# Patient Record
Sex: FEMALE | Race: WHITE | Employment: FULL TIME | URBAN - METROPOLITAN AREA
[De-identification: names, ages, dates, MRNs, and addresses within clinical notes are randomized per-mention and may not be internally consistent; named-entity substitution may affect disease eponyms.]

---

## 2017-01-23 RX ORDER — LOSARTAN POTASSIUM 100 MG/1
100 TABLET ORAL DAILY
Qty: 90 TABLET | Refills: 1 | Status: SHIPPED | OUTPATIENT
Start: 2017-01-23 | End: 2017-08-09 | Stop reason: SDUPTHER

## 2017-01-23 RX ORDER — SIMVASTATIN 20 MG
20 TABLET ORAL NIGHTLY
Qty: 90 TABLET | Refills: 1 | Status: SHIPPED | OUTPATIENT
Start: 2017-01-23 | End: 2017-08-09 | Stop reason: SDUPTHER

## 2017-02-08 RX ORDER — HYDROCHLOROTHIAZIDE 25 MG/1
25 TABLET ORAL DAILY
Qty: 90 TABLET | Refills: 1 | Status: SHIPPED | OUTPATIENT
Start: 2017-02-08 | End: 2017-08-08 | Stop reason: SDUPTHER

## 2017-02-24 DIAGNOSIS — F32.0 MAJOR DEPRESSIVE DISORDER, SINGLE EPISODE, MILD (HCC): ICD-10-CM

## 2017-02-24 DIAGNOSIS — E88.81 DYSMETABOLIC SYNDROME X: ICD-10-CM

## 2017-02-24 RX ORDER — METFORMIN HYDROCHLORIDE 500 MG/1
1000 TABLET, EXTENDED RELEASE ORAL 2 TIMES DAILY
Qty: 360 TABLET | Refills: 1 | Status: SHIPPED | OUTPATIENT
Start: 2017-02-24 | End: 2017-08-08 | Stop reason: SDUPTHER

## 2017-02-24 RX ORDER — ASPIRIN 81 MG/1
81 TABLET ORAL DAILY
Qty: 90 TABLET | Refills: 1 | Status: SHIPPED | OUTPATIENT
Start: 2017-02-24 | End: 2017-08-07 | Stop reason: SDUPTHER

## 2017-02-24 RX ORDER — CITALOPRAM 20 MG/1
20 TABLET ORAL DAILY
Qty: 90 TABLET | Refills: 1 | Status: SHIPPED | OUTPATIENT
Start: 2017-02-24 | End: 2017-10-23 | Stop reason: SDUPTHER

## 2017-08-08 DIAGNOSIS — E88.81 DYSMETABOLIC SYNDROME X: ICD-10-CM

## 2017-08-08 DIAGNOSIS — I10 ESSENTIAL HYPERTENSION: Primary | ICD-10-CM

## 2017-08-08 DIAGNOSIS — E78.5 HYPERLIPIDEMIA, UNSPECIFIED HYPERLIPIDEMIA TYPE: ICD-10-CM

## 2017-08-08 RX ORDER — METFORMIN HYDROCHLORIDE 500 MG/1
1000 TABLET, EXTENDED RELEASE ORAL 2 TIMES DAILY
Qty: 360 TABLET | Refills: 3 | Status: SHIPPED | OUTPATIENT
Start: 2017-08-08 | End: 2018-07-26 | Stop reason: SDUPTHER

## 2017-08-08 RX ORDER — LOSARTAN POTASSIUM 100 MG/1
100 TABLET ORAL DAILY
Qty: 90 TABLET | Refills: 3 | Status: SHIPPED | OUTPATIENT
Start: 2017-08-08 | End: 2017-08-09 | Stop reason: SDUPTHER

## 2017-08-08 RX ORDER — HYDROCHLOROTHIAZIDE 25 MG/1
25 TABLET ORAL DAILY
Qty: 90 TABLET | Refills: 3 | Status: SHIPPED | OUTPATIENT
Start: 2017-08-08 | End: 2018-07-26 | Stop reason: SDUPTHER

## 2017-08-08 RX ORDER — SIMVASTATIN 20 MG
20 TABLET ORAL NIGHTLY
Qty: 90 TABLET | Refills: 3 | Status: SHIPPED | OUTPATIENT
Start: 2017-08-08 | End: 2017-08-09 | Stop reason: SDUPTHER

## 2017-08-09 DIAGNOSIS — F32.0 MAJOR DEPRESSIVE DISORDER, SINGLE EPISODE, MILD (HCC): ICD-10-CM

## 2017-08-09 DIAGNOSIS — E78.5 HYPERLIPIDEMIA, UNSPECIFIED HYPERLIPIDEMIA TYPE: ICD-10-CM

## 2017-08-09 DIAGNOSIS — E88.81 DYSMETABOLIC SYNDROME X: ICD-10-CM

## 2017-08-09 DIAGNOSIS — I10 ESSENTIAL HYPERTENSION: ICD-10-CM

## 2017-08-09 RX ORDER — CITALOPRAM 20 MG/1
20 TABLET ORAL DAILY
Qty: 90 TABLET | Refills: 1 | Status: CANCELLED | OUTPATIENT
Start: 2017-08-09 | End: 2018-08-09

## 2017-08-09 RX ORDER — SIMVASTATIN 20 MG
20 TABLET ORAL NIGHTLY
Qty: 90 TABLET | Refills: 3 | Status: SHIPPED | OUTPATIENT
Start: 2017-08-09 | End: 2018-10-04

## 2017-08-09 RX ORDER — LOSARTAN POTASSIUM 100 MG/1
100 TABLET ORAL DAILY
Qty: 90 TABLET | Refills: 3 | Status: SHIPPED | OUTPATIENT
Start: 2017-08-09 | End: 2018-12-06 | Stop reason: SDUPTHER

## 2017-08-09 RX ORDER — METFORMIN HYDROCHLORIDE 500 MG/1
1000 TABLET, EXTENDED RELEASE ORAL 2 TIMES DAILY
Qty: 360 TABLET | Refills: 3 | Status: CANCELLED | OUTPATIENT
Start: 2017-08-09 | End: 2018-08-09

## 2017-08-09 RX ORDER — ASPIRIN 81 MG/1
81 TABLET ORAL DAILY
Qty: 90 TABLET | Refills: 3 | Status: CANCELLED | OUTPATIENT
Start: 2017-08-09 | End: 2018-08-09

## 2017-08-09 RX ORDER — HYDROCHLOROTHIAZIDE 25 MG/1
25 TABLET ORAL DAILY
Qty: 90 TABLET | Refills: 3 | Status: CANCELLED | OUTPATIENT
Start: 2017-08-09 | End: 2018-08-09

## 2017-08-28 ENCOUNTER — OFFICE VISIT (OUTPATIENT)
Dept: FAMILY MEDICINE CLINIC | Age: 62
End: 2017-08-28
Payer: COMMERCIAL

## 2017-08-28 VITALS
WEIGHT: 191 LBS | HEART RATE: 76 BPM | DIASTOLIC BLOOD PRESSURE: 82 MMHG | BODY MASS INDEX: 36.69 KG/M2 | RESPIRATION RATE: 16 BRPM | TEMPERATURE: 98.8 F | SYSTOLIC BLOOD PRESSURE: 120 MMHG

## 2017-08-28 DIAGNOSIS — M25.411 SHOULDER EFFUSION, RIGHT: ICD-10-CM

## 2017-08-28 DIAGNOSIS — S46.811D INFRASPINATUS TENDON TEAR, RIGHT, SUBSEQUENT ENCOUNTER: ICD-10-CM

## 2017-08-28 DIAGNOSIS — E88.81 DYSMETABOLIC SYNDROME X: ICD-10-CM

## 2017-08-28 DIAGNOSIS — E78.2 MIXED HYPERLIPIDEMIA: ICD-10-CM

## 2017-08-28 DIAGNOSIS — G89.29 CHRONIC RIGHT SHOULDER PAIN: ICD-10-CM

## 2017-08-28 DIAGNOSIS — E78.5 HYPERLIPIDEMIA, UNSPECIFIED HYPERLIPIDEMIA TYPE: ICD-10-CM

## 2017-08-28 DIAGNOSIS — Z01.818 PRE-OP EVALUATION: Primary | ICD-10-CM

## 2017-08-28 DIAGNOSIS — M25.511 CHRONIC RIGHT SHOULDER PAIN: ICD-10-CM

## 2017-08-28 DIAGNOSIS — I10 ESSENTIAL HYPERTENSION: ICD-10-CM

## 2017-08-28 PROCEDURE — 99214 OFFICE O/P EST MOD 30 MIN: CPT | Performed by: NURSE PRACTITIONER

## 2017-08-28 PROCEDURE — 93000 ELECTROCARDIOGRAM COMPLETE: CPT | Performed by: NURSE PRACTITIONER

## 2017-08-28 RX ORDER — HYDROCODONE BITARTRATE AND ACETAMINOPHEN 5; 325 MG/1; MG/1
1 TABLET ORAL EVERY 8 HOURS PRN
COMMUNITY
Start: 2018-07-28 | End: 2018-08-28

## 2017-08-28 ASSESSMENT — ENCOUNTER SYMPTOMS
ALLERGIC/IMMUNOLOGIC COMMENTS: SULFA
EYE PAIN: 0
CONSTIPATION: 0
TROUBLE SWALLOWING: 0
BACK PAIN: 0
BLOOD IN STOOL: 0
SINUS PRESSURE: 0
VOMITING: 0
RHINORRHEA: 0
NAUSEA: 0
EYE REDNESS: 0
SHORTNESS OF BREATH: 0
EYE DISCHARGE: 0
COUGH: 0
ABDOMINAL PAIN: 0
WHEEZING: 0
COLOR CHANGE: 0
CHEST TIGHTNESS: 0
DIARRHEA: 0
SORE THROAT: 0

## 2017-08-29 ENCOUNTER — HOSPITAL ENCOUNTER (OUTPATIENT)
Age: 62
Discharge: HOME | End: 2017-08-29
Payer: COMMERCIAL

## 2017-08-29 DIAGNOSIS — I10 ESSENTIAL HYPERTENSION: ICD-10-CM

## 2017-08-29 DIAGNOSIS — E78.2 MIXED HYPERLIPIDEMIA: ICD-10-CM

## 2017-08-29 DIAGNOSIS — E78.5 HYPERLIPIDEMIA, UNSPECIFIED HYPERLIPIDEMIA TYPE: ICD-10-CM

## 2017-08-29 DIAGNOSIS — E88.81 DYSMETABOLIC SYNDROME X: ICD-10-CM

## 2017-08-29 DIAGNOSIS — Z01.818 PRE-OP EVALUATION: ICD-10-CM

## 2017-08-29 LAB
-: NORMAL
ALBUMIN SERPL-MCNC: 4.3 G/DL (ref 3.5–5.2)
ALBUMIN/GLOBULIN RATIO: NORMAL (ref 1–2.5)
ALP BLD-CCNC: 56 U/L (ref 35–104)
ALT SERPL-CCNC: 21 U/L (ref 5–33)
AMORPHOUS: NORMAL
ANION GAP SERPL CALCULATED.3IONS-SCNC: 11 MMOL/L (ref 9–17)
AST SERPL-CCNC: 19 U/L
BACTERIA: NORMAL
BILIRUB SERPL-MCNC: 0.42 MG/DL (ref 0.3–1.2)
BILIRUBIN URINE: NEGATIVE
BUN BLDV-MCNC: 11 MG/DL (ref 8–23)
BUN/CREAT BLD: 20 (ref 9–20)
CALCIUM SERPL-MCNC: 9.4 MG/DL (ref 8.6–10.4)
CASTS UA: NORMAL /LPF
CHLORIDE BLD-SCNC: 100 MMOL/L (ref 98–107)
CHOLESTEROL/HDL RATIO: 2.7
CHOLESTEROL: 183 MG/DL
CO2: 30 MMOL/L (ref 20–31)
COLOR: YELLOW
COMMENT UA: ABNORMAL
CREAT SERPL-MCNC: 0.56 MG/DL (ref 0.5–0.9)
CRYSTALS, UA: NORMAL /HPF
EKG ATRIAL RATE: 66 BPM
EKG P AXIS: 3 DEGREES
EKG P-R INTERVAL: 156 MS
EKG Q-T INTERVAL: 426 MS
EKG QRS DURATION: 70 MS
EKG QTC CALCULATION (BAZETT): 446 MS
EKG R AXIS: -7 DEGREES
EKG T AXIS: 35 DEGREES
EKG VENTRICULAR RATE: 66 BPM
EPITHELIAL CELLS UA: NORMAL /HPF
ESTIMATED AVERAGE GLUCOSE: 117 MG/DL
GFR AFRICAN AMERICAN: >60 ML/MIN
GFR NON-AFRICAN AMERICAN: >60 ML/MIN
GFR SERPL CREATININE-BSD FRML MDRD: NORMAL ML/MIN/{1.73_M2}
GFR SERPL CREATININE-BSD FRML MDRD: NORMAL ML/MIN/{1.73_M2}
GLUCOSE BLD-MCNC: 96 MG/DL (ref 70–99)
GLUCOSE URINE: NEGATIVE
HBA1C MFR BLD: 5.7 % (ref 4–6)
HCT VFR BLD CALC: 38.1 % (ref 36–46)
HDLC SERPL-MCNC: 69 MG/DL
HEMOGLOBIN: 13 G/DL (ref 12–16)
KETONES, URINE: NEGATIVE
LDL CHOLESTEROL: 72 MG/DL (ref 0–130)
LEUKOCYTE ESTERASE, URINE: ABNORMAL
MCH RBC QN AUTO: 30.7 PG (ref 26–34)
MCHC RBC AUTO-ENTMCNC: 34 G/DL (ref 31–37)
MCV RBC AUTO: 90.2 FL (ref 80–100)
MUCUS: NORMAL
NITRITE, URINE: NEGATIVE
OTHER OBSERVATIONS UA: NORMAL
PDW BLD-RTO: 13.1 % (ref 11.5–14.5)
PH UA: 6.5 (ref 5–8)
PLATELET # BLD: 252 K/UL (ref 130–400)
PMV BLD AUTO: 7 FL (ref 6–12)
POTASSIUM SERPL-SCNC: 4 MMOL/L (ref 3.7–5.3)
PROTEIN UA: NEGATIVE
RBC # BLD: 4.23 M/UL (ref 4–5.2)
RBC UA: NORMAL /HPF (ref 0–2)
RENAL EPITHELIAL, UA: NORMAL /HPF
SODIUM BLD-SCNC: 141 MMOL/L (ref 135–144)
SPECIFIC GRAVITY UA: 1.01 (ref 1–1.03)
TOTAL PROTEIN: 6.7 G/DL (ref 6.4–8.3)
TRICHOMONAS: NORMAL
TRIGL SERPL-MCNC: 212 MG/DL
TURBIDITY: ABNORMAL
URINE HGB: ABNORMAL
UROBILINOGEN, URINE: NORMAL
VLDLC SERPL CALC-MCNC: ABNORMAL MG/DL (ref 1–30)
WBC # BLD: 6.6 K/UL (ref 3.5–11)
WBC UA: NORMAL /HPF (ref 0–5)
YEAST: NORMAL

## 2017-08-29 PROCEDURE — 87088 URINE BACTERIA CULTURE: CPT

## 2017-08-29 PROCEDURE — 87186 SC STD MICRODIL/AGAR DIL: CPT

## 2017-08-29 PROCEDURE — 85027 COMPLETE CBC AUTOMATED: CPT

## 2017-08-29 PROCEDURE — 81001 URINALYSIS AUTO W/SCOPE: CPT

## 2017-08-29 PROCEDURE — 83036 HEMOGLOBIN GLYCOSYLATED A1C: CPT

## 2017-08-29 PROCEDURE — 80061 LIPID PANEL: CPT

## 2017-08-29 PROCEDURE — 87086 URINE CULTURE/COLONY COUNT: CPT

## 2017-08-29 PROCEDURE — 80053 COMPREHEN METABOLIC PANEL: CPT

## 2017-08-29 PROCEDURE — 93005 ELECTROCARDIOGRAM TRACING: CPT

## 2017-08-29 PROCEDURE — 36415 COLL VENOUS BLD VENIPUNCTURE: CPT

## 2017-08-30 DIAGNOSIS — N30.01 ACUTE CYSTITIS WITH HEMATURIA: Primary | ICD-10-CM

## 2017-08-30 LAB
CULTURE: ABNORMAL
CULTURE: ABNORMAL
Lab: ABNORMAL
ORGANISM: ABNORMAL
SPECIMEN DESCRIPTION: ABNORMAL
SPECIMEN DESCRIPTION: ABNORMAL
STATUS: ABNORMAL

## 2017-08-30 RX ORDER — NITROFURANTOIN 25; 75 MG/1; MG/1
100 CAPSULE ORAL 2 TIMES DAILY
Qty: 14 CAPSULE | Refills: 0 | Status: SHIPPED | OUTPATIENT
Start: 2017-08-30 | End: 2017-09-06

## 2017-10-23 DIAGNOSIS — F32.0 MAJOR DEPRESSIVE DISORDER, SINGLE EPISODE, MILD (HCC): ICD-10-CM

## 2017-10-23 RX ORDER — CITALOPRAM 20 MG/1
20 TABLET ORAL DAILY
Qty: 90 TABLET | Refills: 1 | Status: SHIPPED | OUTPATIENT
Start: 2017-10-23 | End: 2018-03-02 | Stop reason: SDUPTHER

## 2017-10-23 NOTE — TELEPHONE ENCOUNTER
LOV: 08/28/17  LR: Jack : 02/24/17    Health Maintenance   Topic Date Due    Zostavax vaccine  02/10/2017    Flu vaccine (1) 09/01/2017    Breast cancer screen  12/05/2018    Cervical cancer screen  10/26/2019    Colon cancer screen colonoscopy  11/12/2020    Lipid screen  08/29/2022    DTaP/Tdap/Td vaccine (2 - Td) 02/27/2023    Hepatitis C screen  Completed    HIV screen  Completed             (applicable per patient's age: Cancer Screenings, Depression Screening, Fall Risk Screening, Immunizations)    Hemoglobin A1C (%)   Date Value   08/29/2017 5.7   10/28/2016 5.9     LDL Cholesterol (mg/dL)   Date Value   08/29/2017 72     AST (U/L)   Date Value   08/29/2017 19     ALT (U/L)   Date Value   08/29/2017 21     BUN (mg/dL)   Date Value   08/29/2017 11      (goal A1C is < 7)   (goal LDL is <100) need 30-50% reduction from baseline     BP Readings from Last 3 Encounters:   08/28/17 120/82   10/26/16 116/74   02/10/16 128/82    (goal /80)      All Future Testing planned in [facility]:  Lab Frequency Next Occurrence   PAP Smear Once 10/26/2016   EKG 12 Lead Once 08/28/2017       Next Visit Date:  No future appointments.          Patient Active Problem List:     Dysmetabolic syndrome X     Essential hypertension     Other and unspecified hyperlipidemia     Hyperlipidemia     Atypical lobular hyperplasia of left breast     Nephrolithiasis     Major depressive disorder, single episode, mild (HCC)

## 2018-03-02 DIAGNOSIS — F32.0 MAJOR DEPRESSIVE DISORDER, SINGLE EPISODE, MILD (HCC): ICD-10-CM

## 2018-03-02 RX ORDER — CITALOPRAM 20 MG/1
20 TABLET ORAL DAILY
Qty: 90 TABLET | Refills: 1 | Status: SHIPPED | OUTPATIENT
Start: 2018-03-02 | End: 2018-10-04

## 2018-03-02 NOTE — TELEPHONE ENCOUNTER
LOV was 10-26-16, LR was 10-23-17. cm  Next Visit Date:  No future appointments.     Health Maintenance   Topic Date Due    Shingles Vaccine (1 of 2 - 2 Dose Series) 02/10/2007    Flu vaccine (1) 09/01/2017    A1C test (Diabetic or Prediabetic)  08/29/2018    Potassium monitoring  08/29/2018    Creatinine monitoring  08/29/2018    Breast cancer screen  12/05/2018    Cervical cancer screen  10/26/2019    Colon cancer screen colonoscopy  11/12/2020    Lipid screen  08/29/2022    DTaP/Tdap/Td vaccine (2 - Td) 02/27/2023    Hepatitis C screen  Completed    HIV screen  Completed       Hemoglobin A1C (%)   Date Value   08/29/2017 5.7   10/28/2016 5.9             ( goal A1C is < 7)   No results found for: LABMICR  LDL Cholesterol (mg/dL)   Date Value   08/29/2017 72       (goal LDL is <100)   AST (U/L)   Date Value   08/29/2017 19     ALT (U/L)   Date Value   08/29/2017 21     BUN (mg/dL)   Date Value   08/29/2017 11     BP Readings from Last 3 Encounters:   08/28/17 120/82   10/26/16 116/74   02/10/16 128/82          (goal 120/80)    All Future Testing planned in [facility]  Lab Frequency Next Occurrence   EKG 12 Lead Once 08/28/2017               Patient Active Problem List:     Dysmetabolic syndrome X     Essential hypertension     Other and unspecified hyperlipidemia     Hyperlipidemia     Atypical lobular hyperplasia of left breast     Nephrolithiasis     Major depressive disorder, single episode, mild ([address]

## 2018-04-26 DIAGNOSIS — F32.0 MAJOR DEPRESSIVE DISORDER, SINGLE EPISODE, MILD (HCC): ICD-10-CM

## 2018-04-26 RX ORDER — CITALOPRAM 20 MG/1
20 TABLET ORAL DAILY
Qty: 90 TABLET | Refills: 1 | OUTPATIENT
Start: 2018-04-26 | End: 2019-04-26

## 2018-07-26 DIAGNOSIS — E88.81 DYSMETABOLIC SYNDROME X: ICD-10-CM

## 2018-07-26 DIAGNOSIS — I10 ESSENTIAL HYPERTENSION: ICD-10-CM

## 2018-07-26 RX ORDER — HYDROCHLOROTHIAZIDE 25 MG/1
25 TABLET ORAL DAILY
Qty: 90 TABLET | Refills: 1 | Status: SHIPPED | OUTPATIENT
Start: 2018-07-26 | End: 2018-12-06 | Stop reason: SDUPTHER

## 2018-07-26 RX ORDER — METFORMIN HYDROCHLORIDE 500 MG/1
1000 TABLET, EXTENDED RELEASE ORAL 2 TIMES DAILY
Qty: 360 TABLET | Refills: 1 | Status: SHIPPED | OUTPATIENT
Start: 2018-07-26 | End: 2018-12-06 | Stop reason: SDUPTHER

## 2018-07-26 NOTE — TELEPHONE ENCOUNTER
LOV:8-28-17  ZSB:[birth year]  LRF:8-8-17  Health Maintenance   Topic Date Due    Shingles Vaccine (1 of 2 - 2 Dose Series) 02/10/2007    A1C test (Diabetic or Prediabetic)  08/29/2018    Potassium monitoring  08/29/2018    Creatinine monitoring  08/29/2018    Flu vaccine (1) 09/01/2018    Breast cancer screen  12/05/2018    Cervical cancer screen  10/26/2019    Colon cancer screen colonoscopy  11/12/2020    Lipid screen  08/29/2022    DTaP/Tdap/Td vaccine (2 - Td) 02/27/2023    Hepatitis C screen  Completed    HIV screen  Completed             (applicable per patient's age: Cancer Screenings, Depression Screening, Fall Risk Screening, Immunizations)    Hemoglobin A1C (%)   Date Value   08/29/2017 5.7   10/28/2016 5.9     LDL Cholesterol (mg/dL)   Date Value   08/29/2017 72     AST (U/L)   Date Value   08/29/2017 19     ALT (U/L)   Date Value   08/29/2017 21     BUN (mg/dL)   Date Value   08/29/2017 11      (goal A1C is < 7)   (goal LDL is <100) need 30-50% reduction from baseline     BP Readings from Last 3 Encounters:   08/28/17 120/82   10/26/16 116/74   02/10/16 128/82    (goal /80)      All Future Testing planned in [facility]:  Lab Frequency Next Occurrence   EKG 12 Lead Once 08/28/2017       Next Visit Date:  Future Appointments  Date Time Provider Henry Spencer   10/4/2018 1:20 PM [facility] [address] [facility]            Patient Active Problem List:     Dysmetabolic syndrome X     Essential hypertension     Other and unspecified hyperlipidemia     Hyperlipidemia     Atypical lobular hyperplasia of left breast     Nephrolithiasis     Major depressive disorder, single episode, mild ([address]

## 2018-08-20 DIAGNOSIS — N20.0 NEPHROLITHIASIS: ICD-10-CM

## 2018-08-20 RX ORDER — HYDROCODONE BITARTRATE AND ACETAMINOPHEN 5; 325 MG/1; MG/1
1 TABLET ORAL EVERY 6 HOURS PRN
Qty: 30 TABLET | Refills: 0 | Status: SHIPPED | OUTPATIENT
Start: 2018-08-20 | End: 2018-08-25

## 2018-09-26 ENCOUNTER — TELEPHONE (OUTPATIENT)
Dept: FAMILY MEDICINE CLINIC | Age: 63
End: 2018-09-26

## 2018-09-26 DIAGNOSIS — E78.5 HYPERLIPIDEMIA, UNSPECIFIED HYPERLIPIDEMIA TYPE: ICD-10-CM

## 2018-09-26 DIAGNOSIS — I10 ESSENTIAL HYPERTENSION: ICD-10-CM

## 2018-09-26 DIAGNOSIS — E88.81 DYSMETABOLIC SYNDROME X: Primary | ICD-10-CM

## 2018-10-02 ENCOUNTER — HOSPITAL ENCOUNTER (OUTPATIENT)
Age: 63
Setting detail: SPECIMEN
Discharge: HOME | End: 2018-10-02
Payer: COMMERCIAL

## 2018-10-02 DIAGNOSIS — I10 ESSENTIAL HYPERTENSION: ICD-10-CM

## 2018-10-02 DIAGNOSIS — E88.81 DYSMETABOLIC SYNDROME X: ICD-10-CM

## 2018-10-02 DIAGNOSIS — E78.5 HYPERLIPIDEMIA, UNSPECIFIED HYPERLIPIDEMIA TYPE: ICD-10-CM

## 2018-10-02 LAB
ALBUMIN SERPL-MCNC: 4.5 G/DL (ref 3.5–5.2)
ALBUMIN/GLOBULIN RATIO: 2 (ref 1–2.5)
ALP BLD-CCNC: 75 U/L (ref 35–104)
ALT SERPL-CCNC: 25 U/L (ref 5–33)
ANION GAP SERPL CALCULATED.3IONS-SCNC: 13 MMOL/L (ref 9–17)
AST SERPL-CCNC: 25 U/L
BILIRUB SERPL-MCNC: 0.49 MG/DL (ref 0.3–1.2)
BUN BLDV-MCNC: 14 MG/DL (ref 8–23)
BUN/CREAT BLD: ABNORMAL (ref 9–20)
CALCIUM SERPL-MCNC: 9.2 MG/DL (ref 8.6–10.4)
CHLORIDE BLD-SCNC: 102 MMOL/L (ref 98–107)
CHOLESTEROL/HDL RATIO: 3
CHOLESTEROL: 207 MG/DL
CO2: 28 MMOL/L (ref 20–31)
CREAT SERPL-MCNC: 0.62 MG/DL (ref 0.5–0.9)
GFR AFRICAN AMERICAN: >60 ML/MIN
GFR NON-AFRICAN AMERICAN: >60 ML/MIN
GFR SERPL CREATININE-BSD FRML MDRD: ABNORMAL ML/MIN/{1.73_M2}
GFR SERPL CREATININE-BSD FRML MDRD: ABNORMAL ML/MIN/{1.73_M2}
GLUCOSE BLD-MCNC: 134 MG/DL (ref 70–99)
HCT VFR BLD CALC: 42.4 % (ref 36.3–47.1)
HDLC SERPL-MCNC: 68 MG/DL
HEMOGLOBIN: 13.2 G/DL (ref 11.9–15.1)
LDL CHOLESTEROL: 115 MG/DL (ref 0–130)
MCH RBC QN AUTO: 29.5 PG (ref 25.2–33.5)
MCHC RBC AUTO-ENTMCNC: 31.1 G/DL (ref 28.4–34.8)
MCV RBC AUTO: 94.6 FL (ref 82.6–102.9)
NRBC AUTOMATED: 0 PER 100 WBC
PDW BLD-RTO: 13.6 % (ref 11.8–14.4)
PLATELET # BLD: 244 K/UL (ref 138–453)
PMV BLD AUTO: 9.7 FL (ref 8.1–13.5)
POTASSIUM SERPL-SCNC: 3.9 MMOL/L (ref 3.7–5.3)
RBC # BLD: 4.48 M/UL (ref 3.95–5.11)
SODIUM BLD-SCNC: 143 MMOL/L (ref 135–144)
TOTAL PROTEIN: 6.8 G/DL (ref 6.4–8.3)
TRIGL SERPL-MCNC: 121 MG/DL
VLDLC SERPL CALC-MCNC: ABNORMAL MG/DL (ref 1–30)
WBC # BLD: 5.8 K/UL (ref 3.5–11.3)

## 2018-10-03 LAB
ESTIMATED AVERAGE GLUCOSE: 143 MG/DL
HBA1C MFR BLD: 6.6 % (ref 4–6)

## 2018-10-04 ENCOUNTER — HOSPITAL ENCOUNTER (OUTPATIENT)
Age: 63
Setting detail: SPECIMEN
Discharge: HOME | End: 2018-10-04
Payer: COMMERCIAL

## 2018-10-04 ENCOUNTER — OFFICE VISIT (OUTPATIENT)
Dept: FAMILY MEDICINE CLINIC | Age: 63
End: 2018-10-04
Payer: COMMERCIAL

## 2018-10-04 VITALS
DIASTOLIC BLOOD PRESSURE: 74 MMHG | TEMPERATURE: 98.5 F | RESPIRATION RATE: 14 BRPM | HEIGHT: 61 IN | WEIGHT: 201 LBS | BODY MASS INDEX: 37.95 KG/M2 | HEART RATE: 76 BPM | SYSTOLIC BLOOD PRESSURE: 126 MMHG

## 2018-10-04 DIAGNOSIS — Z87.442 HISTORY OF KIDNEY STONES: ICD-10-CM

## 2018-10-04 DIAGNOSIS — Z12.39 SCREENING FOR BREAST CANCER: ICD-10-CM

## 2018-10-04 DIAGNOSIS — R10.9 BILATERAL FLANK PAIN: ICD-10-CM

## 2018-10-04 DIAGNOSIS — N60.92 ATYPICAL LOBULAR HYPERPLASIA OF LEFT BREAST: ICD-10-CM

## 2018-10-04 DIAGNOSIS — E11.9 TYPE 2 DIABETES MELLITUS WITHOUT COMPLICATION, WITHOUT LONG-TERM CURRENT USE OF INSULIN (HCC): ICD-10-CM

## 2018-10-04 DIAGNOSIS — E88.81 DYSMETABOLIC SYNDROME X: ICD-10-CM

## 2018-10-04 DIAGNOSIS — I10 ESSENTIAL HYPERTENSION: ICD-10-CM

## 2018-10-04 DIAGNOSIS — F41.1 GENERALIZED ANXIETY DISORDER: ICD-10-CM

## 2018-10-04 DIAGNOSIS — F32.0 MAJOR DEPRESSIVE DISORDER, SINGLE EPISODE, MILD (HCC): ICD-10-CM

## 2018-10-04 DIAGNOSIS — Z00.00 ANNUAL PHYSICAL EXAM: Primary | ICD-10-CM

## 2018-10-04 DIAGNOSIS — E78.5 HYPERLIPIDEMIA, UNSPECIFIED HYPERLIPIDEMIA TYPE: ICD-10-CM

## 2018-10-04 DIAGNOSIS — R73.01 IMPAIRED FASTING BLOOD SUGAR: ICD-10-CM

## 2018-10-04 DIAGNOSIS — R80.9 MICROALBUMINURIA: ICD-10-CM

## 2018-10-04 DIAGNOSIS — Z13.31 POSITIVE DEPRESSION SCREENING: ICD-10-CM

## 2018-10-04 LAB
-: ABNORMAL
AMORPHOUS: ABNORMAL
BACTERIA: ABNORMAL
BILIRUBIN URINE: NEGATIVE
CASTS UA: ABNORMAL /LPF (ref 0–8)
COLOR: YELLOW
COMMENT UA: ABNORMAL
CREATININE URINE: 62.6 MG/DL (ref 28–217)
CRYSTALS, UA: ABNORMAL /HPF
EPITHELIAL CELLS UA: ABNORMAL /HPF (ref 0–5)
GLUCOSE URINE: NEGATIVE
KETONES, URINE: NEGATIVE
LEUKOCYTE ESTERASE, URINE: ABNORMAL
MICROALBUMIN/CREAT 24H UR: 16 MG/L
MICROALBUMIN/CREAT UR-RTO: 26 MCG/MG CREAT
MUCUS: ABNORMAL
NITRITE, URINE: NEGATIVE
OTHER OBSERVATIONS UA: ABNORMAL
PH UA: 7 (ref 5–8)
PROTEIN UA: NEGATIVE
RBC UA: ABNORMAL /HPF (ref 0–4)
RENAL EPITHELIAL, UA: ABNORMAL /HPF
SPECIFIC GRAVITY UA: 1.02 (ref 1–1.03)
TRICHOMONAS: ABNORMAL
TURBIDITY: CLEAR
URINE HGB: NEGATIVE
UROBILINOGEN, URINE: NORMAL
WBC UA: ABNORMAL /HPF (ref 0–5)
YEAST: ABNORMAL

## 2018-10-04 PROCEDURE — 99396 PREV VISIT EST AGE 40-64: CPT | Performed by: PEDIATRICS

## 2018-10-04 PROCEDURE — G8431 POS CLIN DEPRES SCRN F/U DOC: HCPCS | Performed by: PEDIATRICS

## 2018-10-04 RX ORDER — GLUCOSAMINE HCL/CHONDROITIN SU 500-400 MG
CAPSULE ORAL
Qty: 100 STRIP | Refills: 3 | Status: SHIPPED | OUTPATIENT
Start: 2018-10-04

## 2018-10-04 RX ORDER — SIMVASTATIN 40 MG
40 TABLET ORAL EVERY EVENING
Qty: 30 TABLET | Refills: 5 | Status: SHIPPED | OUTPATIENT
Start: 2018-10-04 | End: 2018-12-06 | Stop reason: SDUPTHER

## 2018-10-04 RX ORDER — LANCETS 30 GAUGE
EACH MISCELLANEOUS
Qty: 100 EACH | Refills: 3 | Status: SHIPPED | OUTPATIENT
Start: 2018-10-04

## 2018-10-04 RX ORDER — BLOOD-GLUCOSE METER
1 KIT MISCELLANEOUS DAILY
Qty: 1 KIT | Refills: 0 | Status: SHIPPED | OUTPATIENT
Start: 2018-10-04 | End: 2022-09-01

## 2018-10-04 RX ORDER — CITALOPRAM 40 MG/1
40 TABLET ORAL DAILY
Qty: 30 TABLET | Refills: 5 | Status: SHIPPED | OUTPATIENT
Start: 2018-10-04 | End: 2018-12-06 | Stop reason: SDUPTHER

## 2018-10-04 ASSESSMENT — PATIENT HEALTH QUESTIONNAIRE - PHQ9
7. TROUBLE CONCENTRATING ON THINGS, SUCH AS READING THE NEWSPAPER OR WATCHING TELEVISION: 0
2. FEELING DOWN, DEPRESSED OR HOPELESS: 3
4. FEELING TIRED OR HAVING LITTLE ENERGY: 3
8. MOVING OR SPEAKING SO SLOWLY THAT OTHER PEOPLE COULD HAVE NOTICED. OR THE OPPOSITE, BEING SO FIGETY OR RESTLESS THAT YOU HAVE BEEN MOVING AROUND A LOT MORE THAN USUAL: 1
1. LITTLE INTEREST OR PLEASURE IN DOING THINGS: 3
SUM OF ALL RESPONSES TO PHQ QUESTIONS 1-9: 16
SUM OF ALL RESPONSES TO PHQ9 QUESTIONS 1 & 2: 6
6. FEELING BAD ABOUT YOURSELF - OR THAT YOU ARE A FAILURE OR HAVE LET YOURSELF OR YOUR FAMILY DOWN: 0
3. TROUBLE FALLING OR STAYING ASLEEP: 3
9. THOUGHTS THAT YOU WOULD BE BETTER OFF DEAD, OR OF HURTING YOURSELF: 0
10. IF YOU CHECKED OFF ANY PROBLEMS, HOW DIFFICULT HAVE THESE PROBLEMS MADE IT FOR YOU TO DO YOUR WORK, TAKE CARE OF THINGS AT HOME, OR GET ALONG WITH OTHER PEOPLE: 0
SUM OF ALL RESPONSES TO PHQ QUESTIONS 1-9: 16
5. POOR APPETITE OR OVEREATING: 3

## 2018-10-04 ASSESSMENT — ENCOUNTER SYMPTOMS
CONSTIPATION: 0
EYE REDNESS: 0
SHORTNESS OF BREATH: 0
COLOR CHANGE: 0
COUGH: 0
TROUBLE SWALLOWING: 0
BLOOD IN STOOL: 0
WHEEZING: 0
DIARRHEA: 0
EYE DISCHARGE: 0
NAUSEA: 0
SINUS PRESSURE: 0
BACK PAIN: 0
RHINORRHEA: 0
EYE PAIN: 0
ABDOMINAL PAIN: 0
CHEST TIGHTNESS: 0
VOMITING: 0
SORE THROAT: 0

## 2018-10-04 NOTE — PROGRESS NOTES
have a history of major depression and generalized anxiety disorder for which she has been taking Celexa 20 mg. She states that over the last 10 months she has had a lot of stress with her mother passing away and she's been fighting a lot with her sisters. She states she has been tired and feels exhausted. She has also been stress eating and has gained some weight. She just feels of her anxiety is not well controlled and she does have a depressed mood over this. She does have a history of kidney stones several years ago and she has passed these intermittently and easily. She did request a prescription for Norco several months ago because she has been having some intermittent flank pain off and on - more prominent on the right side. She does wonder if she may have an infection. She does feel bloated at times. She has also noticed cloudy urine with a strong odor. She denies any burning or hematuria. She did have a small area of atypical lobular hyperplasia removed from her left breast several years ago. She is due for routine mammogram.  She has no other concerns. cmw      Review of Systems   Constitutional: Positive for fatigue. Negative for appetite change, chills, fever and unexpected weight change. HENT: Negative for congestion, ear discharge, ear pain, postnasal drip, rhinorrhea, sinus pressure, sore throat, tinnitus and trouble swallowing. Eyes: Negative for pain, discharge and redness. Respiratory: Negative for cough, chest tightness, shortness of breath and wheezing. Snoring   Cardiovascular: Negative for chest pain, palpitations and leg swelling. Gastrointestinal: Negative for abdominal pain, blood in stool, constipation, diarrhea, nausea and vomiting. Endocrine: Negative for polydipsia and polyphagia. New-onset diabetes mellitus type 2 with hemoglobin A1c of 6.6   Genitourinary: Positive for flank pain.  Negative for decreased urine volume, difficulty urinating, dysuria, frequency, hematuria, pelvic pain, urgency and vaginal discharge. Cloudy urine, strong odor to urine for several months off and on with some lower back pain bilaterally. Musculoskeletal: Negative for arthralgias, back pain, myalgias and neck pain. Skin: Negative for color change and rash. Allergic/Immunologic: Negative for immunocompromised state. Neurological: Negative for dizziness, syncope, weakness, light-headedness and headaches. Hematological: Negative for adenopathy. Psychiatric/Behavioral: Positive for dysphoric mood (Previously well controlled with Celexa however symptoms have worsened due to increased stress). Negative for behavioral problems, confusion and sleep disturbance. The patient is nervous/anxious (frequent anxiety secondary to increased stress). Objective:     /74 (Site: Left Upper Arm, Position: Sitting, Cuff Size: Medium Adult)   Pulse 76   Temp 98.5 °F (36.9 °C) (Tympanic)   Resp 14   Ht 5' 0.5\" (1.537 m)   Wt 201 lb (91.2 kg)   LMP 06/21/2006   BMI 38.61 kg/m²        Physical Exam   Constitutional: She is oriented to person, place, and time. She appears well-developed and well-nourished. No distress. obese   HENT:   Head: Normocephalic. Right Ear: External ear normal.   Left Ear: External ear normal.   Nose: Nose normal.   Mouth/Throat: Oropharynx is clear and moist. No oropharyngeal exudate. Eyes: Pupils are equal, round, and reactive to light. Conjunctivae and EOM are normal. Right eye exhibits no discharge. Left eye exhibits no discharge. No scleral icterus. Neck: Normal range of motion. Neck supple. No JVD present. No thyromegaly present. Cardiovascular: Normal rate, regular rhythm and normal heart sounds. No murmur heard. Pulmonary/Chest: Effort normal and breath sounds normal. No respiratory distress. She has no rales. She exhibits no tenderness. Abdominal: Soft. Bowel sounds are normal. She exhibits no mass.  There is no

## 2018-10-07 LAB
CULTURE: ABNORMAL
Lab: ABNORMAL
ORGANISM: ABNORMAL
SPECIMEN DESCRIPTION: ABNORMAL
STATUS: ABNORMAL

## 2018-10-08 ENCOUNTER — TELEPHONE (OUTPATIENT)
Dept: FAMILY MEDICINE CLINIC | Age: 63
End: 2018-10-08

## 2018-10-08 DIAGNOSIS — F32.0 MAJOR DEPRESSIVE DISORDER, SINGLE EPISODE, MILD (HCC): ICD-10-CM

## 2018-10-08 DIAGNOSIS — N39.0 URINARY TRACT INFECTION WITHOUT HEMATURIA, SITE UNSPECIFIED: Primary | ICD-10-CM

## 2018-10-08 RX ORDER — CITALOPRAM 20 MG/1
20 TABLET ORAL DAILY
Qty: 90 TABLET | Refills: 1 | Status: SHIPPED | OUTPATIENT
Start: 2018-10-08 | End: 2018-12-06 | Stop reason: DRUGHIGH

## 2018-10-08 RX ORDER — CIPROFLOXACIN 250 MG/1
250 TABLET, FILM COATED ORAL 2 TIMES DAILY
Qty: 14 TABLET | Refills: 0 | Status: SHIPPED | OUTPATIENT
Start: 2018-10-08 | End: 2019-09-23 | Stop reason: SDUPTHER

## 2018-10-08 NOTE — TELEPHONE ENCOUNTER
Alberto KHAN TOLPP            Patient Active Problem List:     Dysmetabolic syndrome X     Essential hypertension     Other and unspecified hyperlipidemia     Hyperlipidemia     Atypical lobular hyperplasia of left breast     Nephrolithiasis     Major depressive disorder, single episode, mild (HCC)     Microalbuminuria     Type 2 diabetes mellitus without complication, without long-term current use of insulin ([facility] [address]

## 2018-12-06 ENCOUNTER — OFFICE VISIT (OUTPATIENT)
Dept: FAMILY MEDICINE CLINIC | Age: 63
End: 2018-12-06
Payer: COMMERCIAL

## 2018-12-06 VITALS
WEIGHT: 179 LBS | SYSTOLIC BLOOD PRESSURE: 118 MMHG | RESPIRATION RATE: 18 BRPM | DIASTOLIC BLOOD PRESSURE: 70 MMHG | BODY MASS INDEX: 34.38 KG/M2 | HEART RATE: 72 BPM | TEMPERATURE: 98.8 F

## 2018-12-06 DIAGNOSIS — F41.1 GENERALIZED ANXIETY DISORDER: ICD-10-CM

## 2018-12-06 DIAGNOSIS — E88.81 DYSMETABOLIC SYNDROME X: ICD-10-CM

## 2018-12-06 DIAGNOSIS — E11.9 TYPE 2 DIABETES MELLITUS WITHOUT COMPLICATION, WITHOUT LONG-TERM CURRENT USE OF INSULIN (HCC): Primary | ICD-10-CM

## 2018-12-06 DIAGNOSIS — E78.5 HYPERLIPIDEMIA, UNSPECIFIED HYPERLIPIDEMIA TYPE: ICD-10-CM

## 2018-12-06 DIAGNOSIS — F32.0 MAJOR DEPRESSIVE DISORDER, SINGLE EPISODE, MILD (HCC): ICD-10-CM

## 2018-12-06 DIAGNOSIS — R80.9 MICROALBUMINURIA: ICD-10-CM

## 2018-12-06 DIAGNOSIS — I10 ESSENTIAL HYPERTENSION: ICD-10-CM

## 2018-12-06 PROCEDURE — 99214 OFFICE O/P EST MOD 30 MIN: CPT | Performed by: PEDIATRICS

## 2018-12-06 RX ORDER — SIMVASTATIN 40 MG
40 TABLET ORAL EVERY EVENING
Qty: 90 TABLET | Refills: 3 | Status: SHIPPED | OUTPATIENT
Start: 2018-12-06 | End: 2019-04-05 | Stop reason: SDUPTHER

## 2018-12-06 RX ORDER — METFORMIN HYDROCHLORIDE 500 MG/1
1000 TABLET, EXTENDED RELEASE ORAL 2 TIMES DAILY
Qty: 360 TABLET | Refills: 3 | Status: SHIPPED | OUTPATIENT
Start: 2018-12-06 | End: 2020-05-21 | Stop reason: SDUPTHER

## 2018-12-06 RX ORDER — CITALOPRAM 40 MG/1
40 TABLET ORAL DAILY
Qty: 90 TABLET | Refills: 3 | Status: SHIPPED | OUTPATIENT
Start: 2018-12-06 | End: 2020-12-01 | Stop reason: SDUPTHER

## 2018-12-06 RX ORDER — LOSARTAN POTASSIUM 100 MG/1
100 TABLET ORAL DAILY
Qty: 90 TABLET | Refills: 3 | Status: SHIPPED | OUTPATIENT
Start: 2018-12-06 | End: 2019-04-05 | Stop reason: SDUPTHER

## 2018-12-06 RX ORDER — HYDROCHLOROTHIAZIDE 25 MG/1
25 TABLET ORAL DAILY
Qty: 90 TABLET | Refills: 3 | Status: SHIPPED | OUTPATIENT
Start: 2018-12-06 | End: 2020-05-21 | Stop reason: SDUPTHER

## 2018-12-06 ASSESSMENT — ENCOUNTER SYMPTOMS
BLOOD IN STOOL: 0
SORE THROAT: 0
COLOR CHANGE: 0
NAUSEA: 0
SHORTNESS OF BREATH: 0
TROUBLE SWALLOWING: 0
COUGH: 0
SINUS PRESSURE: 0
ABDOMINAL PAIN: 0
CHEST TIGHTNESS: 0
EYE REDNESS: 0
WHEEZING: 0
EYE PAIN: 0
VOMITING: 0
RHINORRHEA: 0
DIARRHEA: 0
CONSTIPATION: 0
EYE DISCHARGE: 0
BACK PAIN: 0

## 2018-12-06 NOTE — PROGRESS NOTES
adherence  Reviewed prior labs and health maintenance  Continue current medications, diet and exercise. Discussed use, benefit, and side effects of prescribed medications. Barriers to medication compliance addressed. Patient given educational materials - see patient instructions  Was a self-tracking handout given in paper form or via [employer]? No    Requested Prescriptions     Pending Prescriptions Disp Refills    citalopram (CELEXA) 40 MG tablet 90 tablet 3     Sig: Take 1 tablet by mouth daily    simvastatin (ZOCOR) 40 MG tablet 90 tablet 3     Sig: Take 1 tablet by mouth every evening    metFORMIN (GLUCOPHAGE-XR) 500 MG extended release tablet 360 tablet 3     Sig: Take 2 tablets by mouth 2 times daily    hydrochlorothiazide (HYDRODIURIL) 25 MG tablet 90 tablet 3     Sig: Take 1 tablet by mouth daily    losartan (COZAAR) 100 MG tablet 90 tablet 3     Sig: Take 1 tablet by mouth daily       All patient questions answered. Patient voiced understanding. Quality Measures    Body mass index is 34.38 kg/m². Elevated. Weight control planned discussed Healthy diet and regular exercise. BP: 118/70 Blood pressure is normal. Treatment plan consists of No treatment change needed.     Lab Results   Component Value Date    LDLCHOLESTEROL 115 10/02/2018    (goal LDL reduction with dx if diabetes is 50% LDL reduction)      PHQ Scores 10/4/2018   PHQ2 Score 6   PHQ9 Score 16     Interpretation of Total Score Depression Severity: 1-4 = Minimal depression, 5-9 = Mild depression, 10-14 = Moderate depression, 15-19 = Moderately severe depression, 20-27 = Severe depression    Electronically signed by Amisha Cordova MD on 12/6/2018 at 10:24 AM

## 2019-02-08 ENCOUNTER — PATIENT MESSAGE (OUTPATIENT)
Dept: FAMILY MEDICINE CLINIC | Age: 64
End: 2019-02-08

## 2019-02-08 DIAGNOSIS — Z12.39 SCREENING FOR BREAST CANCER: ICD-10-CM

## 2019-02-21 LAB
ALBUMIN SERPL-MCNC: 4.6 G/DL
ALP BLD-CCNC: 63 U/L
ALT SERPL-CCNC: 17 U/L
ANION GAP SERPL CALCULATED.3IONS-SCNC: NORMAL MMOL/L
AST SERPL-CCNC: 19 U/L
AVERAGE GLUCOSE: 117
BILIRUB SERPL-MCNC: 0.7 MG/DL (ref 0.1–1.4)
BUN BLDV-MCNC: 9 MG/DL
CALCIUM SERPL-MCNC: 9.9 MG/DL
CHLORIDE BLD-SCNC: 102 MMOL/L
CHOLESTEROL, TOTAL: 139 MG/DL
CHOLESTEROL/HDL RATIO: 2.2
CO2: 31 MMOL/L
CREAT SERPL-MCNC: 0.75 MG/DL
CREATININE, URINE: 128.87
GFR CALCULATED: >60
GLUCOSE BLD-MCNC: 91 MG/DL
HBA1C MFR BLD: 5.7 %
HDLC SERPL-MCNC: 63 MG/DL (ref 35–70)
LDL CHOLESTEROL CALCULATED: 57 MG/DL (ref 0–160)
MICROALBUMIN/CREAT 24H UR: 1.8 MG/G{CREAT}
MICROALBUMIN/CREAT UR-RTO: 14
POTASSIUM SERPL-SCNC: 4 MMOL/L
SODIUM BLD-SCNC: 143 MMOL/L
TOTAL PROTEIN: 7
TRIGL SERPL-MCNC: 96 MG/DL
VLDLC SERPL CALC-MCNC: 19 MG/DL

## 2019-02-22 DIAGNOSIS — I10 ESSENTIAL HYPERTENSION: ICD-10-CM

## 2019-02-22 DIAGNOSIS — E11.9 TYPE 2 DIABETES MELLITUS WITHOUT COMPLICATION, WITHOUT LONG-TERM CURRENT USE OF INSULIN (HCC): ICD-10-CM

## 2019-02-22 DIAGNOSIS — E78.5 HYPERLIPIDEMIA, UNSPECIFIED HYPERLIPIDEMIA TYPE: ICD-10-CM

## 2019-02-25 ENCOUNTER — OFFICE VISIT (OUTPATIENT)
Dept: FAMILY MEDICINE CLINIC | Age: 64
End: 2019-02-25
Payer: COMMERCIAL

## 2019-02-25 VITALS
RESPIRATION RATE: 20 BRPM | HEART RATE: 76 BPM | DIASTOLIC BLOOD PRESSURE: 74 MMHG | SYSTOLIC BLOOD PRESSURE: 116 MMHG | TEMPERATURE: 99.2 F | WEIGHT: 172 LBS | BODY MASS INDEX: 33.04 KG/M2

## 2019-02-25 DIAGNOSIS — E11.29 TYPE 2 DIABETES MELLITUS WITH MICROALBUMINURIA, WITHOUT LONG-TERM CURRENT USE OF INSULIN (HCC): Primary | ICD-10-CM

## 2019-02-25 DIAGNOSIS — R80.9 TYPE 2 DIABETES MELLITUS WITH MICROALBUMINURIA, WITHOUT LONG-TERM CURRENT USE OF INSULIN (HCC): Primary | ICD-10-CM

## 2019-02-25 DIAGNOSIS — E78.5 HYPERLIPIDEMIA, UNSPECIFIED HYPERLIPIDEMIA TYPE: ICD-10-CM

## 2019-02-25 DIAGNOSIS — F32.0 MAJOR DEPRESSIVE DISORDER, SINGLE EPISODE, MILD (HCC): ICD-10-CM

## 2019-02-25 DIAGNOSIS — F41.1 GENERALIZED ANXIETY DISORDER: ICD-10-CM

## 2019-02-25 DIAGNOSIS — R80.9 MICROALBUMINURIA: ICD-10-CM

## 2019-02-25 DIAGNOSIS — E88.81 DYSMETABOLIC SYNDROME X: ICD-10-CM

## 2019-02-25 DIAGNOSIS — I10 ESSENTIAL HYPERTENSION: ICD-10-CM

## 2019-02-25 DIAGNOSIS — Z23 NEED FOR PNEUMOCOCCAL VACCINATION: ICD-10-CM

## 2019-02-25 PROCEDURE — 90732 PPSV23 VACC 2 YRS+ SUBQ/IM: CPT | Performed by: PEDIATRICS

## 2019-02-25 PROCEDURE — 99214 OFFICE O/P EST MOD 30 MIN: CPT | Performed by: PEDIATRICS

## 2019-02-25 PROCEDURE — 90471 IMMUNIZATION ADMIN: CPT | Performed by: PEDIATRICS

## 2019-02-25 ASSESSMENT — ENCOUNTER SYMPTOMS
NAUSEA: 0
TROUBLE SWALLOWING: 0
CONSTIPATION: 0
CHEST TIGHTNESS: 0
SORE THROAT: 0
COLOR CHANGE: 0
WHEEZING: 0
RHINORRHEA: 0
COUGH: 0
EYE PAIN: 0
DIARRHEA: 0
EYE REDNESS: 0
ORTHOPNEA: 0
ABDOMINAL PAIN: 0
BLOOD IN STOOL: 0
SHORTNESS OF BREATH: 0
BLURRED VISION: 0
EYE DISCHARGE: 0
SINUS PRESSURE: 0
BACK PAIN: 0
VOMITING: 0

## 2019-04-05 DIAGNOSIS — E78.5 HYPERLIPIDEMIA, UNSPECIFIED HYPERLIPIDEMIA TYPE: ICD-10-CM

## 2019-04-05 DIAGNOSIS — I10 ESSENTIAL HYPERTENSION: ICD-10-CM

## 2019-04-05 RX ORDER — LOSARTAN POTASSIUM 100 MG/1
100 TABLET ORAL DAILY
Qty: 14 TABLET | Refills: 0 | Status: SHIPPED | OUTPATIENT
Start: 2019-04-05 | End: 2020-12-04 | Stop reason: SDUPTHER

## 2019-04-05 RX ORDER — SIMVASTATIN 40 MG
40 TABLET ORAL EVERY EVENING
Qty: 14 TABLET | Refills: 0 | Status: SHIPPED | OUTPATIENT
Start: 2019-04-05 | End: 2020-12-04 | Stop reason: SDUPTHER

## 2019-04-05 RX ORDER — LOSARTAN POTASSIUM 100 MG/1
100 TABLET ORAL DAILY
Qty: 90 TABLET | Refills: 1 | Status: SHIPPED | OUTPATIENT
Start: 2019-04-05 | End: 2019-08-28 | Stop reason: SDUPTHER

## 2019-04-05 RX ORDER — SIMVASTATIN 40 MG
40 TABLET ORAL EVERY EVENING
Qty: 90 TABLET | Refills: 1 | Status: SHIPPED | OUTPATIENT
Start: 2019-04-05 | End: 2019-08-28 | Stop reason: SDUPTHER

## 2019-04-05 NOTE — TELEPHONE ENCOUNTER
LOV: 2/25/2019  LRF: 12/6/2018  RTO: Scheduled    Patient needs short term supply sent to local pharmacy. Health Maintenance   Topic Date Due    Diabetic retinal exam  02/10/1965    Flu vaccine (Season Ended) 12/01/2019 (Originally 9/1/2019)    Shingles Vaccine (1 of 2) 12/01/2019 (Originally 2/10/2007)    Diabetic foot exam  10/04/2019    Cervical cancer screen  10/26/2019    A1C test (Diabetic or Prediabetic)  02/21/2020    Diabetic microalbuminuria test  02/21/2020    Lipid screen  02/21/2020    Potassium monitoring  02/21/2020    Creatinine monitoring  02/21/2020    Colon cancer screen colonoscopy  11/12/2020    Breast cancer screen  02/05/2021    DTaP/Tdap/Td vaccine (2 - Td) 02/27/2023    Pneumococcal 0-64 years at Risk Vaccine  Completed    Hepatitis C screen  Completed    HIV screen  Completed             (applicable per patient's age: Cancer Screenings, Depression Screening, Fall Risk Screening, Immunizations)    Hemoglobin A1C (%)   Date Value   02/21/2019 5.7   10/02/2018 6.6 (H)   08/29/2017 5.7     Microalb/Crt.  Ratio (mcg/mg creat)   Date Value   10/04/2018 26 (H)     LDL Cholesterol (mg/dL)   Date Value   10/02/2018 115     LDL Calculated (mg/dL)   Date Value   02/21/2019 57     AST (U/L)   Date Value   02/21/2019 19     ALT (U/L)   Date Value   02/21/2019 17     BUN (mg/dL)   Date Value   02/21/2019 9      (goal A1C is < 7)   (goal LDL is <100) need 30-50% reduction from baseline     BP Readings from Last 3 Encounters:   02/25/19 116/74   12/06/18 118/70   10/04/18 126/74    (goal /80)      All Future Testing planned in [facility]:  Lab Frequency Next Occurrence   Hemoglobin A1C Once 05/26/2019   Comprehensive Metabolic Panel Once 16/55/3232       Next Visit Date:  Future Appointments   Date Time Provider Henry Spencer   6/12/2019  9:30 AM MD Mora Mccormick [facility]            Patient Active Problem List:     Dysmetabolic syndrome X     Essential hypertension     Other and unspecified hyperlipidemia     Hyperlipidemia     Atypical lobular hyperplasia of left breast     Nephrolithiasis     Major depressive disorder, single episode, mild (HCC)     Microalbuminuria     Type 2 diabetes mellitus without complication, without long-term current use of insulin ([facility] [address]

## 2019-05-08 DIAGNOSIS — F32.0 MAJOR DEPRESSIVE DISORDER, SINGLE EPISODE, MILD (HCC): Primary | ICD-10-CM

## 2019-05-08 RX ORDER — CITALOPRAM 20 MG/1
20 TABLET ORAL DAILY
Qty: 90 TABLET | Refills: 1 | Status: SHIPPED | OUTPATIENT
Start: 2019-05-08 | End: 2019-08-28 | Stop reason: SDUPTHER

## 2019-05-08 NOTE — TELEPHONE ENCOUNTER
LRF 12-6-18 # 90 RF 1  LOV 2-25-19  RTO 3 mo, scheduled    Health Maintenance   Topic Date Due    Flu vaccine (Season Ended) 12/01/2019 (Originally 9/1/2019)    Shingles Vaccine (1 of 2) 12/01/2019 (Originally 2/10/2007)    Diabetic foot exam  10/04/2019    Cervical cancer screen  10/26/2019    A1C test (Diabetic or Prediabetic)  02/21/2020    Diabetic microalbuminuria test  02/21/2020    Lipid screen  02/21/2020    Potassium monitoring  02/21/2020    Creatinine monitoring  02/21/2020    Diabetic retinal exam  05/06/2020    Colon cancer screen colonoscopy  11/12/2020    Breast cancer screen  02/05/2021    DTaP/Tdap/Td vaccine (2 - Td) 02/27/2023    Pneumococcal 0-64 years Vaccine  Completed    Hepatitis C screen  Completed    HIV screen  Completed             (applicable per patient's age: Cancer Screenings, Depression Screening, Fall Risk Screening, Immunizations)    Hemoglobin A1C (%)   Date Value   02/21/2019 5.7   10/02/2018 6.6 (H)   08/29/2017 5.7     Microalb/Crt.  Ratio (mcg/mg creat)   Date Value   10/04/2018 26 (H)     LDL Cholesterol (mg/dL)   Date Value   10/02/2018 115     LDL Calculated (mg/dL)   Date Value   02/21/2019 57     AST (U/L)   Date Value   02/21/2019 19     ALT (U/L)   Date Value   02/21/2019 17     BUN (mg/dL)   Date Value   02/21/2019 9      (goal A1C is < 7)   (goal LDL is <100) need 30-50% reduction from baseline     BP Readings from Last 3 Encounters:   02/25/19 116/74   12/06/18 118/70   10/04/18 126/74    (goal /80)      All Future Testing planned in [facility]:  Lab Frequency Next Occurrence   Hemoglobin A1C Once 05/26/2019   Comprehensive Metabolic Panel Once 46/75/4625       Next Visit Date:  Future Appointments   Date Time Provider Henry Spencer   6/12/2019  9:30 AM MD Boris Luna            Patient Active Problem List:     Dysmetabolic syndrome X     Essential hypertension     Other and unspecified hyperlipidemia Hyperlipidemia     Atypical lobular hyperplasia of left breast     Nephrolithiasis     Major depressive disorder, single episode, mild (HCC)     Microalbuminuria     Type 2 diabetes mellitus without complication, without long-term current use of insulin ([facility] [address]

## 2019-08-28 DIAGNOSIS — I10 ESSENTIAL HYPERTENSION: ICD-10-CM

## 2019-08-28 DIAGNOSIS — E78.5 HYPERLIPIDEMIA, UNSPECIFIED HYPERLIPIDEMIA TYPE: ICD-10-CM

## 2019-08-28 DIAGNOSIS — F32.0 MAJOR DEPRESSIVE DISORDER, SINGLE EPISODE, MILD (HCC): ICD-10-CM

## 2019-09-03 RX ORDER — CITALOPRAM 20 MG/1
20 TABLET ORAL DAILY
Qty: 90 TABLET | Refills: 1 | Status: SHIPPED | OUTPATIENT
Start: 2019-09-03 | End: 2020-02-10

## 2019-09-03 RX ORDER — LOSARTAN POTASSIUM 100 MG/1
100 TABLET ORAL DAILY
Qty: 90 TABLET | Refills: 1 | Status: SHIPPED | OUTPATIENT
Start: 2019-09-03 | End: 2020-05-21 | Stop reason: SDUPTHER

## 2019-09-03 RX ORDER — SIMVASTATIN 40 MG
40 TABLET ORAL NIGHTLY
Qty: 90 TABLET | Refills: 1 | Status: SHIPPED | OUTPATIENT
Start: 2019-09-03 | End: 2020-05-21 | Stop reason: SDUPTHER

## 2019-09-03 NOTE — TELEPHONE ENCOUNTER
LRF 4-5-19 losartan # 90 RF 1, simvastatain # 90 RF 1, 5-8-19 citalopram # 80 Rf 1  LOV 2-25-19  RTO 3 mo, scheduled    Health Maintenance   Topic Date Due    Flu vaccine (1) 12/01/2019 (Originally 9/1/2019)    Shingles Vaccine (1 of 2) 12/01/2019 (Originally 2/10/2007)    Diabetic foot exam  10/04/2019    Cervical cancer screen  10/26/2019    A1C test (Diabetic or Prediabetic)  02/21/2020    Diabetic microalbuminuria test  02/21/2020    Lipid screen  02/21/2020    Potassium monitoring  02/21/2020    Creatinine monitoring  02/21/2020    Diabetic retinal exam  05/06/2020    Colon cancer screen colonoscopy  11/12/2020    Breast cancer screen  02/05/2021    DTaP/Tdap/Td vaccine (2 - Td) 02/27/2023    Pneumococcal 0-64 years Vaccine  Completed    Hepatitis C screen  Completed    HIV screen  Completed             (applicable per patient's age: Cancer Screenings, Depression Screening, Fall Risk Screening, Immunizations)    Hemoglobin A1C (%)   Date Value   02/21/2019 5.7   10/02/2018 6.6 (H)   08/29/2017 5.7     Microalb/Crt.  Ratio (mcg/mg creat)   Date Value   10/04/2018 26 (H)     LDL Cholesterol (mg/dL)   Date Value   10/02/2018 115     LDL Calculated (mg/dL)   Date Value   02/21/2019 57     AST (U/L)   Date Value   02/21/2019 19     ALT (U/L)   Date Value   02/21/2019 17     BUN (mg/dL)   Date Value   02/21/2019 9      (goal A1C is < 7)   (goal LDL is <100) need 30-50% reduction from baseline     BP Readings from Last 3 Encounters:   02/25/19 116/74   12/06/18 118/70   10/04/18 126/74    (goal /80)      All Future Testing planned in [facility]:  Lab Frequency Next Occurrence       Next Visit Date:  Future Appointments   Date Time Provider Henry Spencer   11/7/2019 10:40 AM Delrae Garb, MD [facility]            Patient Active Problem List:     Dysmetabolic syndrome X     Essential hypertension     Other and unspecified hyperlipidemia     Hyperlipidemia     Atypical lobular

## 2019-09-23 ENCOUNTER — TELEPHONE (OUTPATIENT)
Dept: FAMILY MEDICINE CLINIC | Age: 64
End: 2019-09-23

## 2019-09-23 DIAGNOSIS — N39.0 URINARY TRACT INFECTION WITHOUT HEMATURIA, SITE UNSPECIFIED: ICD-10-CM

## 2019-09-23 RX ORDER — CIPROFLOXACIN 250 MG/1
250 TABLET, FILM COATED ORAL 2 TIMES DAILY
Qty: 14 TABLET | Refills: 0 | Status: SHIPPED | OUTPATIENT
Start: 2019-09-23 | End: 2019-09-30

## 2020-02-10 RX ORDER — CITALOPRAM 20 MG/1
TABLET ORAL
Qty: 90 TABLET | Refills: 1 | Status: SHIPPED | OUTPATIENT
Start: 2020-02-10 | End: 2020-05-21 | Stop reason: SDUPTHER

## 2020-06-03 RX ORDER — CITALOPRAM 40 MG/1
40 TABLET ORAL DAILY
Qty: 90 TABLET | Refills: 3 | Status: CANCELLED | OUTPATIENT
Start: 2020-06-03

## 2020-06-03 RX ORDER — SIMVASTATIN 40 MG
40 TABLET ORAL NIGHTLY
Qty: 90 TABLET | Refills: 1 | Status: CANCELLED | OUTPATIENT
Start: 2020-06-03 | End: 2020-11-30

## 2020-06-03 RX ORDER — CITALOPRAM 20 MG/1
20 TABLET ORAL DAILY
Qty: 90 TABLET | Refills: 1 | Status: CANCELLED | OUTPATIENT
Start: 2020-06-03 | End: 2021-06-03

## 2020-06-03 RX ORDER — SIMVASTATIN 40 MG
40 TABLET ORAL EVERY EVENING
Qty: 14 TABLET | Refills: 0 | Status: CANCELLED | OUTPATIENT
Start: 2020-06-03 | End: 2020-06-17

## 2020-06-03 RX ORDER — LOSARTAN POTASSIUM 100 MG/1
100 TABLET ORAL DAILY
Qty: 90 TABLET | Refills: 1 | Status: CANCELLED | OUTPATIENT
Start: 2020-06-03 | End: 2020-11-30

## 2020-06-03 RX ORDER — METFORMIN HYDROCHLORIDE 500 MG/1
1000 TABLET, EXTENDED RELEASE ORAL 2 TIMES DAILY
Qty: 360 TABLET | Refills: 1 | Status: CANCELLED | OUTPATIENT
Start: 2020-06-03 | End: 2021-06-03

## 2020-06-03 RX ORDER — HYDROCHLOROTHIAZIDE 25 MG/1
25 TABLET ORAL DAILY
Qty: 90 TABLET | Refills: 1 | Status: CANCELLED | OUTPATIENT
Start: 2020-06-03 | End: 2021-06-03

## 2020-06-03 RX ORDER — LOSARTAN POTASSIUM 100 MG/1
100 TABLET ORAL DAILY
Qty: 14 TABLET | Refills: 0 | Status: CANCELLED | OUTPATIENT
Start: 2020-06-03 | End: 2020-06-17

## 2020-06-03 RX ORDER — ASPIRIN 81 MG/1
81 TABLET ORAL DAILY
Qty: 90 TABLET | Refills: 1 | Status: CANCELLED | OUTPATIENT
Start: 2020-06-03 | End: 2021-06-03

## 2020-08-12 ENCOUNTER — OFFICE VISIT (OUTPATIENT)
Dept: FAMILY MEDICINE CLINIC | Age: 65
End: 2020-08-12
Payer: COMMERCIAL

## 2020-08-12 VITALS
WEIGHT: 201 LBS | DIASTOLIC BLOOD PRESSURE: 80 MMHG | HEART RATE: 83 BPM | BODY MASS INDEX: 37.95 KG/M2 | HEIGHT: 61 IN | OXYGEN SATURATION: 99 % | SYSTOLIC BLOOD PRESSURE: 128 MMHG | TEMPERATURE: 97.5 F

## 2020-08-12 LAB
ALBUMIN SERPL-MCNC: 4.5 G/DL
ALP BLD-CCNC: 70 U/L
ALT SERPL-CCNC: 44 U/L
AST SERPL-CCNC: 39 U/L
AVERAGE GLUCOSE: 134
BASOPHILS ABSOLUTE: NORMAL
BASOPHILS RELATIVE PERCENT: NORMAL
BILIRUB SERPL-MCNC: 0.6 MG/DL (ref 0.1–1.4)
BUN BLDV-MCNC: 11 MG/DL
CALCIUM SERPL-MCNC: 10 MG/DL
CHLORIDE BLD-SCNC: 99 MMOL/L
CHOLESTEROL, TOTAL: 158 MG/DL
CHOLESTEROL/HDL RATIO: 2.2
CO2: 28 MMOL/L
CREAT SERPL-MCNC: 0.64 MG/DL
CREATININE URINE: 69.79 MG/DL
EOSINOPHILS ABSOLUTE: NORMAL
EOSINOPHILS RELATIVE PERCENT: NORMAL
GLUCOSE FASTING: 81 MG/DL
HBA1C MFR BLD: 6.3 %
HCT VFR BLD CALC: 41.7 % (ref 36–46)
HDLC SERPL-MCNC: 71 MG/DL (ref 35–70)
HEMOGLOBIN: 13.8 G/DL (ref 12–16)
LDL CHOLESTEROL CALCULATED: 60 MG/DL (ref 0–160)
LYMPHOCYTES ABSOLUTE: NORMAL
LYMPHOCYTES RELATIVE PERCENT: NORMAL
MCH RBC QN AUTO: 30.4 PG
MCHC RBC AUTO-ENTMCNC: 33 G/DL
MCV RBC AUTO: 92 FL
MICROALBUMIN/CREAT 24H UR: 3.5 MG/G{CREAT}
MONOCYTES ABSOLUTE: NORMAL
MONOCYTES RELATIVE PERCENT: NORMAL
NEUTROPHILS ABSOLUTE: NORMAL
NEUTROPHILS RELATIVE PERCENT: NORMAL
NONHDLC SERPL-MCNC: ABNORMAL MG/DL
PLATELET # BLD: 249 K/ΜL
PMV BLD AUTO: 7.8 FL
POTASSIUM SERPL-SCNC: 3.8 MMOL/L
RBC # BLD: 4.52 10^6/ΜL
SODIUM BLD-SCNC: 140 MMOL/L
TOTAL PROTEIN: 7.5 G/DL (ref 6.4–8.2)
TRIGL SERPL-MCNC: 135 MG/DL
TROPONIN: 0.03
TSH SERPL DL<=0.05 MIU/L-ACNC: 1.36 UIU/ML
VITAMIN D 25-HYDROXY: 10
VITAMIN D2, 25 HYDROXY: NORMAL
VITAMIN D3,25 HYDROXY: NORMAL
VLDLC SERPL CALC-MCNC: ABNORMAL MG/DL
WBC # BLD: 7 10^3/ML

## 2020-08-12 PROCEDURE — 99214 OFFICE O/P EST MOD 30 MIN: CPT | Performed by: NURSE PRACTITIONER

## 2020-08-12 PROCEDURE — 93000 ELECTROCARDIOGRAM COMPLETE: CPT | Performed by: NURSE PRACTITIONER

## 2020-08-12 RX ORDER — LORATADINE 10 MG/1
10 TABLET ORAL DAILY
Qty: 30 TABLET | Refills: 0 | Status: SHIPPED | OUTPATIENT
Start: 2020-08-12 | End: 2020-09-11

## 2020-08-12 RX ORDER — FLUTICASONE PROPIONATE 50 MCG
1 SPRAY, SUSPENSION (ML) NASAL DAILY
Qty: 2 BOTTLE | Refills: 1 | Status: SHIPPED | OUTPATIENT
Start: 2020-08-12

## 2020-08-12 NOTE — PATIENT INSTRUCTIONS
It was my pleasure to meet with you today. Please contact me with any questions or concerns, and please notify myself or our manger if there is anyway we can improve our service in your health care needs.  Below I have listed some instructions and information that pertain to today's visit.    -You have been advised to continue all current medication, otherwise not discussed in today's visit  -New medications and refills will been sent and made available at pharmacy or mail away  -Heathy daily diet to include low salt, low fat heart healthy and low carbohydrate, low sugar diabetic diet  -Drink 6-8 glasses of water daily  -Complete  fasting (8-12 hours - water, black coffee, plain tea ok) labs and/any other testing ordered prior to next scheduled followup  -Continue daily exercise with in your physical capacity

## 2020-08-12 NOTE — PROGRESS NOTES
[facility]   [address] [address]  [phone]    8/12/2020     Patient ID: Juan Pablo Lambert is a 63 y.o. female. CHIEF COMPLAINT:     Juan Pablo Lambert presents today for her medical conditions/complaints as noted below. Sindhu Cook is c/o of New Patient (new to provider ); Fatigue; Pharyngitis (state that she has a lump in throat x 10 months ); Back Pain (upper back x 1 week ); and Chest Pain (x 1 week )  . REVIEWED:      [x] Past Medical, Family, and Social History was reviewed per writer and does contribute to the patient presenting condition.     [x] Laboratory Results, Vital signs, Imaging, Active Problems, Immunizations, Current/Recently Discontinued Medications, Health Maintenance Activities Due, Referral Notes (if available) were reviewed per writer     [x] Reviewed Depression screening if taken or valid today or any other valid screening tool (others seen below) Interpretation of Total Score DepressionSeverity: 1-4 = Minimal depression, 5-9 = Mild depression, 10-14 = Moderate depression, 15-19 = Moderately severe depression, 20-27 =Severe depression    PHQ Scores 10/4/2018   PHQ2 Score 6   PHQ9 Score 16       Interpretation of Total Score DepressionSeverity: 1-4 = Minimal depression, 5-9 = Mild depression, 10-14 = Moderate depression, 15-19 = Moderately severe depression, 20-27 = Severe depression    Allergies   Allergen Reactions    Sulfa Antibiotics Hives and Swelling     Current Outpatient Medications   Medication Sig Dispense Refill    fluticasone (FLONASE) 50 MCG/ACT nasal spray 1 spray by Each Nostril route daily 2 Bottle 1    loratadine (CLARITIN) 10 MG tablet Take 1 tablet by mouth daily 30 tablet 0    aspirin 81 MG EC tablet Take 1 tablet by mouth daily 90 tablet 1    metFORMIN (GLUCOPHAGE-XR) 500 MG extended release tablet Take 2 tablets by mouth 2 times daily 360 tablet 1    hydroCHLOROthiazide (HYDRODIURIL) 25 MG tablet Take 1 tablet by mouth daily 90 tablet 1    simvastatin (ZOCOR) 40 MG tablet Take 1 tablet by mouth nightly 90 tablet 1    losartan (COZAAR) 100 MG tablet Take 1 tablet by mouth daily 90 tablet 1    citalopram (CELEXA) 40 MG tablet Take 1 tablet by mouth daily 90 tablet 3    glucose monitoring kit (FREESTYLE) monitoring kit 1 kit by Does not apply route daily 1 kit 0    Lancets MISC Check blood sugars once daily 100 each 3    blood glucose monitor strips Check blood sugars once daily 100 strip 3    losartan (COZAAR) 100 MG tablet Take 1 tablet by mouth daily for 14 days 14 tablet 0    simvastatin (ZOCOR) 40 MG tablet Take 1 tablet by mouth every evening for 14 days 14 tablet 0     No current facility-administered medications for this visit. Past Medical History:   Diagnosis Date    Allergic rhinitis     Cardiac murmur     \"no problems and no chest pains\"    Depression     Dysmetabolic syndrome X     patient on metformin    Hyperlipidemia     Hypertension     Obsessive-compulsive disorder       Past Surgical History:   Procedure Laterality Date    APPENDECTOMY      BREAST BIOPSY Left 01/27/2016    excision lateral breast mass    BREAST SURGERY Left     breast biopsy x 2 (benign)    COLONOSCOPY       No family history on file. Social History     Tobacco Use    Smoking status: Never Smoker    Smokeless tobacco: Never Used   Substance Use Topics    Alcohol use: Yes     Comment: rare        Patient Care Team:    REVIEW OF SYSTEMS:     Review of Systems    HISTORY OF PRESENT ILLNESS     Patient is established patient of practice previously seen by Dr. Morelia Berkowitz. Patient is new to provider, but here today for the following complaints and evaluation:     Patient is a 62-year-old female that comes today for evaluation and continuation of monitoring of her chronic conditions including hypertension, type 2 diabetes, history of kidney stones, hyperlipidemia, major depression, and obesity.     Patient has no recent labs completed. Does report that her blood pressure has been well controlled with current medication losartan hydrochlorothiazide. Does report some intermittent chest pain, reports is been going on for a week been having midsternal happens with activity and rest.  We will repeat an EKG as she does not have chest pain currently. However is unsure if this is related to anxiety. Denies any palpitations. Or any shortness of breath at rest.  Patient does have a complaint of a sore throat. Noting that she has been feeling rather raw at times feels as if she has a lump in her throat. Denies any difficulty swallowing feels as if she is clearing her throat but unsure if this once again is dealing with panic. Patient denies any difficulty swallowing. Is also being treated for hyperlipidemia for which he takes simvastatin she denies any side effects or issues with medication current on all of her dosages. Otherwise stable we will repeat labs today. It has been treated for depression. She is currently taking Celexa. Does report unsure if she is having anxiety related issues. But reports that her depression has been well controlled with medication. Currently working repeat labs check her thyroid and ordered thyroid ultrasound as we did palpate an area that could be an nodule. I am also going to complete a throat culture in office, due to patient's soreness of her throat in the last week. PHYSICAL EXAM:     /80 (Site: Left Upper Arm, Position: Sitting, Cuff Size: Large Adult)   Pulse 83   Temp 97.5 °F (36.4 °C)   Ht 5' 0.5\" (1.537 m)   Wt 201 lb (91.2 kg)   LMP 06/21/2006   SpO2 99%   BMI 38.61 kg/m²    Physical Exam  Vitals signs reviewed. Constitutional:       Appearance: Normal appearance. She is obese. She is not ill-appearing. HENT:      Head: Normocephalic and atraumatic. Eyes:      Extraocular Movements: Extraocular movements intact.       Pupils: Pupils are equal, round, and reactive to light. Neck:      Musculoskeletal: Normal range of motion and neck supple. Thyroid: Thyromegaly present. Vascular: No carotid bruit. Trachea: Tracheal tenderness present. Cardiovascular:      Rate and Rhythm: Normal rate and regular rhythm. Pulses: Normal pulses. Heart sounds: Normal heart sounds. Pulmonary:      Effort: Pulmonary effort is normal.      Breath sounds: Normal breath sounds. Abdominal:      General: Abdomen is protuberant. There is no distension. Palpations: Abdomen is soft. There is no mass. Tenderness: There is no abdominal tenderness. There is no right CVA tenderness or left CVA tenderness. Musculoskeletal: Normal range of motion. General: No swelling or tenderness. Lymphadenopathy:      Cervical: Cervical adenopathy present. Right cervical: Superficial cervical adenopathy present. Left cervical: Superficial cervical adenopathy present. Skin:     General: Skin is warm. Capillary Refill: Capillary refill takes less than 2 seconds. Findings: No erythema or rash. Neurological:      General: No focal deficit present. Mental Status: She is alert and oriented to person, place, and time. Psychiatric:         Mood and Affect: Mood normal.         Behavior: Behavior normal.          ASSESSMENT /PLAN     1. Type 2 diabetes mellitus without complication, without long-term current use of insulin (HCC)    - Hemoglobin A1C; Future  - Microalbumin, Ur; Future    2. Essential hypertension    - CBC; Future  - Comprehensive Metabolic Panel, Fasting; Future  - Lipid Panel; Future    3. Hyperlipidemia, unspecified hyperlipidemia type    - CBC; Future  - Comprehensive Metabolic Panel, Fasting; Future  - Lipid Panel; Future    4. Nephrolithiasis      5. Major depressive disorder, single episode, mild ([address]      6. Generalized anxiety disorder    - TSH without Reflex; Future    7.  Vitamin D deficiency    - Vitamin D 25 Hydroxy; Future    8. Acute viral pharyngitis    - fluticasone (FLONASE) 50 MCG/ACT nasal spray; 1 spray by Each Nostril route daily  Dispense: 2 Bottle; Refill: 1  - loratadine (CLARITIN) 10 MG tablet; Take 1 tablet by mouth daily  Dispense: 30 tablet; Refill: 0  - Culture, Throat; Future    9. Post-nasal drip    - fluticasone (FLONASE) 50 MCG/ACT nasal spray; 1 spray by Each Nostril route daily  Dispense: 2 Bottle; Refill: 1  - loratadine (CLARITIN) 10 MG tablet; Take 1 tablet by mouth daily  Dispense: 30 tablet; Refill: 0  - Culture, Throat; Future    10. Thyroid enlarged    - US THYROID; Future    11. Intermittent chest pain    - CBC; Future  - EKG 12 Lead  - Troponin I; Future      Return in about 6 weeks (around 9/23/2020) for Annual physical and lab followup. COMMUNICATION:           The best way to find yourself is to lose yourself in the service of others - [address] [facility]. [address] [facility]   [email or web address]  Office: [phone]   Cell: [phone]    Electronically signed by ACOSTA Hale on 8/14/2020 at 10:50 PM

## 2020-08-20 ENCOUNTER — TELEPHONE (OUTPATIENT)
Dept: FAMILY MEDICINE CLINIC | Age: 65
End: 2020-08-20

## 2020-08-20 NOTE — TELEPHONE ENCOUNTER
Patient is contacting the office today because she had her blood work completed and can see the results in my chart but has not been called. Patient is also calling because she has been having Urgency, hesitancy, pressure symptoms and is concerned with a UTI. Patient states that her symptoms started 1 week ago. Patient asking for a UA and Culture. Order pending. Please advise.

## 2020-08-25 ENCOUNTER — TELEPHONE (OUTPATIENT)
Dept: FAMILY MEDICINE CLINIC | Age: 65
End: 2020-08-25

## 2020-08-28 NOTE — PROGRESS NOTES
Notified in regards to her ultrasound with right thyroid nodule. Fine-needle aspiration has been recommended. Patient would like to go to [facility] as it is close to her home and her insurance restrictions to [facility], due to [facility] coverage. Patient has also been notified to contact me once she is scheduled for her procedure for a follow-up within 5 to 6 days after the procedure. So that we can go over results on what we need to do.

## 2020-09-15 ENCOUNTER — OFFICE VISIT (OUTPATIENT)
Dept: FAMILY MEDICINE CLINIC | Age: 65
End: 2020-09-15
Payer: COMMERCIAL

## 2020-09-15 VITALS
HEIGHT: 61 IN | DIASTOLIC BLOOD PRESSURE: 84 MMHG | TEMPERATURE: 96.9 F | OXYGEN SATURATION: 98 % | HEART RATE: 76 BPM | WEIGHT: 203 LBS | SYSTOLIC BLOOD PRESSURE: 132 MMHG | BODY MASS INDEX: 38.33 KG/M2

## 2020-09-15 PROCEDURE — 99214 OFFICE O/P EST MOD 30 MIN: CPT | Performed by: NURSE PRACTITIONER

## 2020-09-15 ASSESSMENT — ENCOUNTER SYMPTOMS
RHINORRHEA: 0
COUGH: 1
CONSTIPATION: 0
SORE THROAT: 0
WHEEZING: 0
SHORTNESS OF BREATH: 0
APNEA: 0
DIARRHEA: 0

## 2020-09-15 NOTE — PROGRESS NOTES
[address] [facility]   [address] [address]  [phone]    9/15/2020     Patient ID: Hilton George is a 63 y.o. female. CHIEF COMPLAINT:     Hilton George presents today for her medical conditions/complaints as noted below. Vinita Patricia is c/o of Follow-up and Results (biopsy thyroid )  . REVIEWED:      [x] Past Medical, Family, and Social History was reviewed per writer and does contribute to the patient presenting condition.     [x] Laboratory Results, Vital signs, Imaging, Active Problems, Immunizations, Current/Recently Discontinued Medications, Health Maintenance Activities Due, Referral Notes (if available) were reviewed per writer     [x] Reviewed Depression screening if taken or valid today or any other valid screening tool (others seen below) Interpretation of Total Score DepressionSeverity: 1-4 = Minimal depression, 5-9 = Mild depression, 10-14 = Moderate depression, 15-19 = Moderately severe depression, 20-27 =Severe depression    PHQ Scores 10/4/2018   PHQ2 Score 6   PHQ9 Score 16       Interpretation of Total Score DepressionSeverity: 1-4 = Minimal depression, 5-9 = Mild depression, 10-14 = Moderate depression, 15-19 = Moderately severe depression, 20-27 = Severe depression    Allergies   Allergen Reactions    Sulfa Antibiotics Hives and Swelling     Current Outpatient Medications   Medication Sig Dispense Refill    fluticasone (FLONASE) 50 MCG/ACT nasal spray 1 spray by Each Nostril route daily 2 Bottle 1    aspirin 81 MG EC tablet Take 1 tablet by mouth daily 90 tablet 1    metFORMIN (GLUCOPHAGE-XR) 500 MG extended release tablet Take 2 tablets by mouth 2 times daily 360 tablet 1    hydroCHLOROthiazide (HYDRODIURIL) 25 MG tablet Take 1 tablet by mouth daily 90 tablet 1    simvastatin (ZOCOR) 40 MG tablet Take 1 tablet by mouth nightly 90 tablet 1    losartan (COZAAR) 100 MG tablet Take 1 tablet by mouth daily 90 tablet 1    losartan (COZAAR) 100 MG tablet Take 1 tablet by mouth daily for 14 days 14 tablet 0    simvastatin (ZOCOR) 40 MG tablet Take 1 tablet by mouth every evening for 14 days 14 tablet 0    citalopram (CELEXA) 40 MG tablet Take 1 tablet by mouth daily 90 tablet 3    glucose monitoring kit (FREESTYLE) monitoring kit 1 kit by Does not apply route daily 1 kit 0    Lancets MISC Check blood sugars once daily 100 each 3    blood glucose monitor strips Check blood sugars once daily 100 strip 3     No current facility-administered medications for this visit. Past Medical History:   Diagnosis Date    Allergic rhinitis     Cardiac murmur     \"no problems and no chest pains\"    Depression     Dysmetabolic syndrome X     patient on metformin    Hyperlipidemia     Hypertension     Obsessive-compulsive disorder       Past Surgical History:   Procedure Laterality Date    APPENDECTOMY      BREAST BIOPSY Left 01/27/2016    excision lateral breast mass    BREAST SURGERY Left     breast biopsy x 2 (benign)    COLONOSCOPY       No family history on file. Social History     Tobacco Use    Smoking status: Never Smoker    Smokeless tobacco: Never Used   Substance Use Topics    Alcohol use: Yes     Comment: rare          REVIEW OF SYSTEMS:     Review of Systems   Constitutional: Negative for activity change, fatigue and unexpected weight change. HENT: Negative for congestion, ear pain, hearing loss, rhinorrhea and sore throat. Respiratory: Positive for cough ( denies congestion). Negative for apnea, shortness of breath and wheezing. Cardiovascular: Negative for chest pain, palpitations and leg swelling. Gastrointestinal: Negative for constipation and diarrhea. Musculoskeletal: Negative for arthralgias and gait problem. Neurological: Negative for dizziness, weakness and headaches. Psychiatric/Behavioral: Negative for confusion. The patient is not nervous/anxious.         HISTORY OF PRESENT ILLNESS     Patient presents for follow up from thyroid Abdominal:      General: Abdomen is protuberant. Musculoskeletal:      Right shoulder: She exhibits normal range of motion and no deformity. Left shoulder: She exhibits normal range of motion and no deformity. Skin:     General: Skin is moist.      Coloration: Skin is not cyanotic or jaundiced. Findings: No rash. Neurological:      General: No focal deficit present. Mental Status: She is alert and oriented to person, place, and time. Gait: Gait is intact. Psychiatric:         Attention and Perception: Attention normal. She does not perceive auditory or visual hallucinations. Mood and Affect: Mood normal.         Speech: Speech normal.          ASSESSMENT /PLAN     1. Thyroid enlarged  -complete labs prior to next appointment  -healthy diet as discussed  -daily exercise with in physical limitations    - Sudhakar Simon MD, Endocrinology, [facility]  - Thyroid Peroxidase Antibody; Future  - T3; Future  - T4; Future  - TSH; Future  - External Referral To General Surgery    2. Right thyroid nodule  -complete labs prior to next appointment  -healthy diet as discussed    - Sudhakar Simon MD, Endocrinology, [facility]  - Thyroid Peroxidase Antibody; Future  - T3; Future  - T4; Future  - TSH; Future  - External Referral To General Surgery    3. Hashimoto's disease  -healthy diet as discussed  -daily exercise with in physical limitations    - External Referral To General Surgery      Return in about 3 months (around 12/15/2020) for symptom check and lab review. COMMUNICATION:           The best way to find yourself is to lose yourself in the service of others - [address] [facility]   [email or web address]  Office: [phone]   Cell: [phone]    Electronically signed by ACOSTA Mendes on 9/16/2020 at 3:13 PM

## 2020-12-01 RX ORDER — SIMVASTATIN 40 MG
40 TABLET ORAL EVERY EVENING
Qty: 14 TABLET | Refills: 0 | Status: CANCELLED | OUTPATIENT
Start: 2020-12-01 | End: 2020-12-15

## 2020-12-02 RX ORDER — SIMVASTATIN 40 MG
40 TABLET ORAL NIGHTLY
Qty: 90 TABLET | Refills: 1 | Status: SHIPPED | OUTPATIENT
Start: 2020-12-02 | End: 2021-05-10

## 2020-12-02 RX ORDER — CITALOPRAM 40 MG/1
40 TABLET ORAL DAILY
Qty: 90 TABLET | Refills: 3 | Status: SHIPPED | OUTPATIENT
Start: 2020-12-02 | End: 2021-11-16

## 2020-12-02 RX ORDER — LOSARTAN POTASSIUM 100 MG/1
100 TABLET ORAL DAILY
Qty: 90 TABLET | Refills: 1 | Status: SHIPPED | OUTPATIENT
Start: 2020-12-02 | End: 2021-05-10

## 2020-12-02 RX ORDER — HYDROCHLOROTHIAZIDE 25 MG/1
25 TABLET ORAL DAILY
Qty: 90 TABLET | Refills: 1 | Status: SHIPPED | OUTPATIENT
Start: 2020-12-02 | End: 2021-05-17

## 2020-12-02 RX ORDER — METFORMIN HYDROCHLORIDE 500 MG/1
1000 TABLET, EXTENDED RELEASE ORAL 2 TIMES DAILY
Qty: 360 TABLET | Refills: 1 | Status: SHIPPED | OUTPATIENT
Start: 2020-12-02 | End: 2021-05-10

## 2020-12-02 NOTE — TELEPHONE ENCOUNTER
LV 9/15/20  LRF 5/28 cozaar 90 tablets/1 RF         5/28 zocor 90 Tablets/1 RF          5/28 hctz 90 tablets 1 RF         5/28 Metformin 360 tablets/1 RF          12/6/2018 90 tablets 3 RF  Health Maintenance   Topic Date Due    Shingles Vaccine (1 of 2) 02/10/2007    Diabetic foot exam  10/04/2019    Cervical cancer screen  10/26/2019    Diabetic retinal exam  05/06/2020    Flu vaccine (1) 09/01/2020    Colon cancer screen colonoscopy  11/12/2020    Breast cancer screen  02/05/2021    A1C test (Diabetic or Prediabetic)  08/12/2021    Diabetic microalbuminuria test  08/12/2021    Lipid screen  08/12/2021    Potassium monitoring  08/12/2021    Creatinine monitoring  08/12/2021    DTaP/Tdap/Td vaccine (2 - Td) 02/27/2023    Hepatitis C screen  Completed    HIV screen  Completed    Hepatitis A vaccine  Aged Out    Hib vaccine  Aged Out    Meningococcal (ACWY) vaccine  Aged Out    Pneumococcal 0-64 years Vaccine  Aged Out             (applicable per patient's age: Cancer Screenings, Depression Screening, Fall Risk Screening, Immunizations)    Hemoglobin A1C (%)   Date Value   08/12/2020 6.3   02/21/2019 5.7   10/02/2018 6.6 (H)     Microalb/Crt.  Ratio (mcg/mg creat)   Date Value   10/04/2018 26 (H)     LDL Cholesterol (mg/dL)   Date Value   10/02/2018 115     LDL Calculated (mg/dL)   Date Value   08/12/2020 60     AST (U/L)   Date Value   08/12/2020 39     ALT (U/L)   Date Value   08/12/2020 44     BUN (mg/dL)   Date Value   08/12/2020 11      (goal A1C is < 7)   (goal LDL is <100) need 30-50% reduction from baseline     BP Readings from Last 3 Encounters:   09/15/20 132/84   08/12/20 128/80   02/25/19 116/74    (goal /80)      All Future Testing planned in [facility]:  Lab Frequency Next Occurrence   Culture, Throat Once 08/12/2020   Urinalysis Reflex to Culture Once 08/21/2020   CT GUIDED FNA Once 08/28/2020   Thyroid Peroxidase Antibody Once 09/15/2020   T3 Once 09/15/2020   T4 Once 09/15/2020 TSH Once 09/15/2020       Next Visit Date:  Future Appointments   Date Time Provider Henry Spencer   12/16/2020  9:30 AM MIGDALIA Hammonds - [facility]            Patient Active Problem List:     Dysmetabolic syndrome X     Essential hypertension     Hyperlipidemia     Atypical lobular hyperplasia of left breast     Nephrolithiasis     Major depressive disorder, single episode, mild (HCC)     Microalbuminuria     Type 2 diabetes mellitus without complication, without long-term current use of insulin ([facility] [address]

## 2020-12-04 NOTE — TELEPHONE ENCOUNTER
LOV 9/15/20  LRF Losartan Simvastatin 4/5/20, ASA 5/28/20    Health Maintenance   Topic Date Due    Shingles Vaccine (1 of 2) 02/10/2007    Diabetic foot exam  10/04/2019    Cervical cancer screen  10/26/2019    Diabetic retinal exam  05/06/2020    Flu vaccine (1) 09/01/2020    Colon cancer screen colonoscopy  11/12/2020    Breast cancer screen  02/05/2021    A1C test (Diabetic or Prediabetic)  08/12/2021    Diabetic microalbuminuria test  08/12/2021    Lipid screen  08/12/2021    Potassium monitoring  08/12/2021    Creatinine monitoring  08/12/2021    DTaP/Tdap/Td vaccine (2 - Td) 02/27/2023    Hepatitis C screen  Completed    HIV screen  Completed    Hepatitis A vaccine  Aged Out    Hib vaccine  Aged Out    Meningococcal (ACWY) vaccine  Aged Out    Pneumococcal 0-64 years Vaccine  Aged Out             (applicable per patient's age: Cancer Screenings, Depression Screening, Fall Risk Screening, Immunizations)    Hemoglobin A1C (%)   Date Value   08/12/2020 6.3   02/21/2019 5.7   10/02/2018 6.6 (H)     Microalb/Crt.  Ratio (mcg/mg creat)   Date Value   10/04/2018 26 (H)     LDL Cholesterol (mg/dL)   Date Value   10/02/2018 115     LDL Calculated (mg/dL)   Date Value   08/12/2020 60     AST (U/L)   Date Value   08/12/2020 39     ALT (U/L)   Date Value   08/12/2020 44     BUN (mg/dL)   Date Value   08/12/2020 11      (goal A1C is < 7)   (goal LDL is <100) need 30-50% reduction from baseline     BP Readings from Last 3 Encounters:   09/15/20 132/84   08/12/20 128/80   02/25/19 116/74    (goal /80)      All Future Testing planned in [facility]:  Lab Frequency Next Occurrence   Culture, Throat Once 08/12/2020   Urinalysis Reflex to Culture Once 08/21/2020   CT GUIDED FNA Once 08/28/2020   Thyroid Peroxidase Antibody Once 09/15/2020   T3 Once 09/15/2020   T4 Once 09/15/2020   TSH Once 09/15/2020       Next Visit Date:  Future Appointments   Date Time Provider Henry Spencer   12/16/2020  9:30 AM Lisa Palma, APRN - CNP [facility]            Patient Active Problem List:     Dysmetabolic syndrome X     Essential hypertension     Hyperlipidemia     Atypical lobular hyperplasia of left breast     Nephrolithiasis     Major depressive disorder, single episode, mild (HCC)     Microalbuminuria     Type 2 diabetes mellitus without complication, without long-term current use of insulin ([facility] [address]

## 2020-12-07 RX ORDER — ASPIRIN 81 MG/1
81 TABLET ORAL DAILY
Qty: 90 TABLET | Refills: 1 | Status: SHIPPED | OUTPATIENT
Start: 2020-12-07 | End: 2021-09-29 | Stop reason: ALTCHOICE

## 2020-12-07 RX ORDER — LOSARTAN POTASSIUM 100 MG/1
100 TABLET ORAL DAILY
Qty: 90 TABLET | Refills: 0 | Status: SHIPPED | OUTPATIENT
Start: 2020-12-07 | End: 2021-09-29 | Stop reason: SDUPTHER

## 2020-12-07 RX ORDER — SIMVASTATIN 40 MG
40 TABLET ORAL EVERY EVENING
Qty: 90 TABLET | Refills: 1 | Status: SHIPPED | OUTPATIENT
Start: 2020-12-07 | End: 2021-09-29 | Stop reason: SDUPTHER

## 2020-12-16 ENCOUNTER — OFFICE VISIT (OUTPATIENT)
Dept: FAMILY MEDICINE CLINIC | Age: 65
End: 2020-12-16
Payer: COMMERCIAL

## 2020-12-16 VITALS
WEIGHT: 203 LBS | SYSTOLIC BLOOD PRESSURE: 112 MMHG | TEMPERATURE: 97.8 F | OXYGEN SATURATION: 98 % | DIASTOLIC BLOOD PRESSURE: 74 MMHG | HEART RATE: 82 BPM | BODY MASS INDEX: 38.99 KG/M2

## 2020-12-16 PROCEDURE — 90471 IMMUNIZATION ADMIN: CPT | Performed by: NURSE PRACTITIONER

## 2020-12-16 PROCEDURE — 99214 OFFICE O/P EST MOD 30 MIN: CPT | Performed by: NURSE PRACTITIONER

## 2020-12-16 PROCEDURE — 90686 IIV4 VACC NO PRSV 0.5 ML IM: CPT | Performed by: NURSE PRACTITIONER

## 2020-12-16 SDOH — ECONOMIC STABILITY: INCOME INSECURITY: HOW HARD IS IT FOR YOU TO PAY FOR THE VERY BASICS LIKE FOOD, HOUSING, MEDICAL CARE, AND HEATING?: NOT HARD AT ALL

## 2020-12-16 SDOH — ECONOMIC STABILITY: FOOD INSECURITY: WITHIN THE PAST 12 MONTHS, YOU WORRIED THAT YOUR FOOD WOULD RUN OUT BEFORE YOU GOT MONEY TO BUY MORE.: NEVER TRUE

## 2020-12-16 SDOH — ECONOMIC STABILITY: TRANSPORTATION INSECURITY
IN THE PAST 12 MONTHS, HAS THE LACK OF TRANSPORTATION KEPT YOU FROM MEDICAL APPOINTMENTS OR FROM GETTING MEDICATIONS?: NO

## 2020-12-16 SDOH — ECONOMIC STABILITY: TRANSPORTATION INSECURITY
IN THE PAST 12 MONTHS, HAS LACK OF TRANSPORTATION KEPT YOU FROM MEETINGS, WORK, OR FROM GETTING THINGS NEEDED FOR DAILY LIVING?: NO

## 2020-12-16 SDOH — ECONOMIC STABILITY: FOOD INSECURITY: WITHIN THE PAST 12 MONTHS, THE FOOD YOU BOUGHT JUST DIDN'T LAST AND YOU DIDN'T HAVE MONEY TO GET MORE.: NEVER TRUE

## 2020-12-16 ASSESSMENT — ENCOUNTER SYMPTOMS
ABDOMINAL PAIN: 1
BLURRED VISION: 1

## 2020-12-16 NOTE — PROGRESS NOTES
[facility] [facility]   [address]   [phone]    12/16/2020     Patient ID: Kevin Lovelace is a 63 y.o. female. CHIEF COMPLAINT:     Kevin Lovelace presents today for Diabetes and Depression  .     REVIEWED:        [x] Laboratory Results, Vital signs, Imaging, Active Problems, Immunizations, Current/Recently Discontinued Medications, Health Maintenance Activities Due, Referral Notes (if available) were reviewed per writer     [x] Reviewed Depression screening if taken or valid today or any other valid screening tool (others seen below) Interpretation of Total Score DepressionSeverity: 1-4 = Minimal depression, 5-9 = Mild depression, 10-14 = Moderate depression, 15-19 = Moderately severe depression, 20-27 =Severe depression    PHQ Scores 10/4/2018   PHQ2 Score 6   PHQ9 Score 16       Interpretation of Total Score DepressionSeverity: 1-4 = Minimal depression, 5-9 = Mild depression, 10-14 = Moderate depression, 15-19 = Moderately severe depression, 20-27 = Severe depression    Allergies   Allergen Reactions    Sulfa Antibiotics Hives and Swelling     Current Outpatient Medications   Medication Sig Dispense Refill    aspirin 81 MG EC tablet Take 1 tablet by mouth daily 90 tablet 1    citalopram (CELEXA) 40 MG tablet Take 1 tablet by mouth daily 90 tablet 3    metFORMIN (GLUCOPHAGE-XR) 500 MG extended release tablet Take 2 tablets by mouth 2 times daily 360 tablet 1    hydroCHLOROthiazide (HYDRODIURIL) 25 MG tablet Take 1 tablet by mouth daily 90 tablet 1    simvastatin (ZOCOR) 40 MG tablet Take 1 tablet by mouth nightly 90 tablet 1    losartan (COZAAR) 100 MG tablet Take 1 tablet by mouth daily 90 tablet 1    fluticasone (FLONASE) 50 MCG/ACT nasal spray 1 spray by Each Nostril route daily 2 Bottle 1    glucose monitoring kit (FREESTYLE) monitoring kit 1 kit by Does not apply route daily 1 kit 0    Lancets MISC Check blood sugars once daily 100 each 3  blood glucose monitor strips Check blood sugars once daily 100 strip 3    losartan (COZAAR) 100 MG tablet Take 1 tablet by mouth daily (Patient not taking: Reported on 12/16/2020) 90 tablet 0    simvastatin (ZOCOR) 40 MG tablet Take 1 tablet by mouth every evening (Patient not taking: Reported on 12/16/2020) 90 tablet 1     No current facility-administered medications for this visit. Past Medical History:   Diagnosis Date    Allergic rhinitis     Cardiac murmur     \"no problems and no chest pains\"    Depression     Dysmetabolic syndrome X     patient on metformin    Hyperlipidemia     Hypertension     Obsessive-compulsive disorder       Past Surgical History:   Procedure Laterality Date    APPENDECTOMY      BREAST BIOPSY Left 01/27/2016    excision lateral breast mass    BREAST SURGERY Left     breast biopsy x 2 (benign)    COLONOSCOPY       No family history on file. Social History     Tobacco Use    Smoking status: Never Smoker    Smokeless tobacco: Never Used   Substance Use Topics    Alcohol use: Yes     Comment: rare        REVIEW OF SYSTEMS:     Review of Systems   Constitutional: Negative for activity change, fatigue and unexpected weight change. HENT: Negative for congestion, ear pain, hearing loss, rhinorrhea and sore throat. Eyes: Positive for blurred vision. Respiratory: Negative for cough and shortness of breath. Cardiovascular: Negative for chest pain, palpitations and leg swelling. Gastrointestinal: Positive for abdominal pain. Negative for constipation and diarrhea. Endocrine: Negative for polydipsia, polyphagia and polyuria. Musculoskeletal: Negative for arthralgias and gait problem. Neurological: Negative for dizziness, weakness and headaches. Psychiatric/Behavioral: Negative for confusion. The patient is not nervous/anxious.         HISTORY OF PRESENT ILLNESS Patient was to be seen by ENT for thryoid nodule. Patient notes that she has not been contacted for further evaluation. She has undergone biopsy. She was seen by surgeon, but noted they will not proceed until she has been seen by endocrinology - she is upset as she received 800 pill from surgeon, with no discussion in regards to her further plan of care. This referral was completed after     She reports that her swelling and exhaustion has improved, but she feels very exhausted at the same time. Patient reports that she has been excessively hot and cold. Diabetes  She has type 2 diabetes mellitus. Her disease course has been stable. There are no hypoglycemic associated symptoms. Pertinent negatives for hypoglycemia include no confusion, dizziness, headaches or nervousness/anxiousness. Associated symptoms include blurred vision. Pertinent negatives for diabetes include no chest pain, no fatigue, no polydipsia, no polyphagia, no polyuria and no weakness. There are no hypoglycemic complications. Symptoms are stable. There are no diabetic complications. Abdominal Pain  This is a new (loose she states that every time she eats she poops several times per day - which she states she goes up to 6 x per day. She states that she has been having left lower cramping pain. ) problem. The current episode started in the past 7 days. The onset quality is sudden. The problem occurs constantly. Pertinent negatives include no arthralgias, constipation, diarrhea or headaches.    reports that she does eat normally - notes that she doesn't any sweets, doesn't eat until diner reports that she has been, reports that bm get worse with after eating - loose not at time she will experience some liquid stool       PHYSICAL EXAM:     /74   Pulse 82   Temp 97.8 °F (36.6 °C) (Temporal)   Wt 203 lb (92.1 kg)   LMP 06/21/2006   SpO2 98%   BMI 38.99 kg/m²    Physical Exam  Constitutional: Appearance: Normal appearance. She is well-developed. She is not ill-appearing. Eyes:      General:         Right eye: No discharge. Left eye: No discharge. Extraocular Movements: Extraocular movements intact. Conjunctiva/sclera: Conjunctivae normal.   Neck:      Musculoskeletal: Normal range of motion. Thyroid: No thyroid mass. Vascular: No JVD. Pulmonary:      Effort: Pulmonary effort is normal. No respiratory distress. Musculoskeletal:      Right shoulder: She exhibits normal range of motion and no deformity. Left shoulder: She exhibits normal range of motion and no deformity. Skin:     General: Skin is moist.      Coloration: Skin is not cyanotic or jaundiced. Findings: No rash. Neurological:      General: No focal deficit present. Mental Status: She is alert and oriented to person, place, and time. Gait: Gait is intact. Psychiatric:         Attention and Perception: Attention normal. She does not perceive auditory or visual hallucinations. Mood and Affect: Mood normal.         Speech: Speech normal.         ASSESSMENT /PLAN     1. Abdominal cramping in left lower quadrant  -complete testing as ordered    - XR ABDOMEN (KUB) (SINGLE AP VIEW); Future    2. Fatigue, unspecified type  -complete labs prior to next appointment    - CBC Auto Differential; Future  - Ferritin; Future  - Iron and TIBC; Future  - Vitamin B12; Future  - Basic Metabolic Panel; Future    3. Type 2 diabetes mellitus without complication, without long-term current use of insulin (HCC)  -complete labs prior to next appointment    - Hemoglobin A1C; Future    4. Need for influenza vaccination  -vaccination to  completed as ordered in todays visit    - INFLUENZA, QUADV, 3 YRS AND OLDER, IM PF, PREFILL SYR OR SDV, 0.5ML (CARLIN Avalos)      Return for previously scheduled appointment.      Echocardiogram to be ordered at next appointment      COMMUNICATION: More than 50% of this 30 minute visit was spent discussing Plan of Care in detail including but not limited education, counseling, and coordination of care. The best way to find yourself is to lose yourself in the service of others - [address] [facility]   [email or web address]  Office: [phone]       Electronically signed by ACOSTA Jones on 12/31/2020 at 2:09 PM

## 2020-12-31 NOTE — PATIENT INSTRUCTIONS
It was my pleasure to meet with you today. Please contact me with any questions or concerns, and please notify myself or our manager if there is anyway we can improve our service in your health care needs.  Below I have listed some instructions and information that pertain to today's visit.    -You have been advised to continue all current medication, otherwise not discussed in today's visit  -New medications and refills will been sent and made available at pharmacy or mail away  -Heathy daily diet to include low salt and low carbohydrate, low sugar diabetic diet  -Drink 6-8 glasses of water daily  -Complete  fasting (8-12 hours - water, black coffee, plain tea ok) labs and/any other testing ordered prior to next scheduled followup       Sowmya Munson [address] - INFORMATION TO BE RESENT TO OFFICE FOR APPOINTMENT

## 2021-01-21 LAB
AVERAGE GLUCOSE: 128
BASOPHILS ABSOLUTE: 0 /ΜL
BASOPHILS RELATIVE PERCENT: 0.7 %
BUN BLDV-MCNC: 10 MG/DL
CALCIUM SERPL-MCNC: 9.2 MG/DL
CHLORIDE BLD-SCNC: 100 MMOL/L
CO2: 30 MMOL/L
CREAT SERPL-MCNC: 0.66 MG/DL
EOSINOPHILS ABSOLUTE: 0.2 /ΜL
EOSINOPHILS RELATIVE PERCENT: 3.1 %
FERRITIN: 55 NG/ML (ref 9–150)
GFR CALCULATED: >60
GLUCOSE BLD-MCNC: 102 MG/DL
HBA1C MFR BLD: 6.1 %
HCT VFR BLD CALC: 38.9 % (ref 36–46)
HEMOGLOBIN: 12.9 G/DL (ref 12–16)
IRON: 55
LYMPHOCYTES ABSOLUTE: 1.8 /ΜL
LYMPHOCYTES RELATIVE PERCENT: 26.3 %
MCH RBC QN AUTO: 30.2 PG
MCHC RBC AUTO-ENTMCNC: 33.1 G/DL
MCV RBC AUTO: 91 FL
MONOCYTES ABSOLUTE: 0.5 /ΜL
MONOCYTES RELATIVE PERCENT: 7 %
NEUTROPHILS ABSOLUTE: 4.3 /ΜL
NEUTROPHILS RELATIVE PERCENT: 62.9 %
PDW BLD-RTO: 13.9 %
PLATELET # BLD: 224 K/ΜL
PMV BLD AUTO: 7.6 FL
POTASSIUM SERPL-SCNC: 3.8 MMOL/L
RBC # BLD: 4.26 10^6/ΜL
SODIUM BLD-SCNC: 141 MMOL/L
T3 TOTAL: 2.77
T4 TOTAL: 0.88
TOTAL IRON BINDING CAPACITY: 431
TSH SERPL DL<=0.05 MIU/L-ACNC: 1.34 UIU/ML
VITAMIN B-12: 277
WBC # BLD: 6.8 10^3/ML

## 2021-01-25 ENCOUNTER — TELEPHONE (OUTPATIENT)
Dept: FAMILY MEDICINE CLINIC | Age: 66
End: 2021-01-25

## 2021-01-25 NOTE — TELEPHONE ENCOUNTER
[facility] received a call from:    Name of Caller: Akilah    Relationship to patient: Nurse    Organization name: [facility]    Best contact number: [phone]    Reason for call: Karine Hauser is calling because she needs some additional information on patient. She needs patient's most recent biopsy report.   Please advise

## 2021-05-03 ENCOUNTER — OFFICE VISIT (OUTPATIENT)
Dept: FAMILY MEDICINE CLINIC | Age: 66
End: 2021-05-03
Payer: COMMERCIAL

## 2021-05-03 VITALS
TEMPERATURE: 97.8 F | DIASTOLIC BLOOD PRESSURE: 60 MMHG | BODY MASS INDEX: 35.73 KG/M2 | HEART RATE: 72 BPM | WEIGHT: 186 LBS | SYSTOLIC BLOOD PRESSURE: 104 MMHG | OXYGEN SATURATION: 98 %

## 2021-05-03 DIAGNOSIS — E55.9 VITAMIN D DEFICIENCY: ICD-10-CM

## 2021-05-03 DIAGNOSIS — E04.1 RIGHT THYROID NODULE: ICD-10-CM

## 2021-05-03 DIAGNOSIS — R53.83 FATIGUE, UNSPECIFIED TYPE: ICD-10-CM

## 2021-05-03 DIAGNOSIS — Z12.31 ENCOUNTER FOR SCREENING MAMMOGRAM FOR MALIGNANT NEOPLASM OF BREAST: ICD-10-CM

## 2021-05-03 DIAGNOSIS — E78.5 HYPERLIPIDEMIA, UNSPECIFIED HYPERLIPIDEMIA TYPE: ICD-10-CM

## 2021-05-03 DIAGNOSIS — E04.9 THYROID ENLARGED: ICD-10-CM

## 2021-05-03 DIAGNOSIS — Z12.11 SCREEN FOR COLON CANCER: ICD-10-CM

## 2021-05-03 DIAGNOSIS — I10 ESSENTIAL HYPERTENSION: ICD-10-CM

## 2021-05-03 DIAGNOSIS — E11.9 TYPE 2 DIABETES MELLITUS WITHOUT COMPLICATION, WITHOUT LONG-TERM CURRENT USE OF INSULIN (HCC): Primary | ICD-10-CM

## 2021-05-03 DIAGNOSIS — R79.0 ABNORMAL IRON SATURATION: ICD-10-CM

## 2021-05-03 DIAGNOSIS — E88.81 DYSMETABOLIC SYNDROME X: ICD-10-CM

## 2021-05-03 DIAGNOSIS — E11.9 TYPE 2 DIABETES MELLITUS WITHOUT COMPLICATION, WITHOUT LONG-TERM CURRENT USE OF INSULIN (HCC): ICD-10-CM

## 2021-05-03 PROCEDURE — 99214 OFFICE O/P EST MOD 30 MIN: CPT | Performed by: NURSE PRACTITIONER

## 2021-05-03 RX ORDER — ACETAMINOPHEN 500 MG
1 TABLET ORAL DAILY
Qty: 90 TABLET | Refills: 1 | Status: SHIPPED | OUTPATIENT
Start: 2021-05-03 | End: 2023-09-01

## 2021-05-03 ASSESSMENT — ENCOUNTER SYMPTOMS
SORE THROAT: 0
SHORTNESS OF BREATH: 0
CONSTIPATION: 0
RHINORRHEA: 0
COUGH: 0
DIARRHEA: 0

## 2021-05-03 NOTE — PROGRESS NOTES
[facility]   [address] [place] [address] [address] [place] [address]    5/3/2021     CHIEF COMPLAINT:     Jonel Black (:  1955) is a 61 y.o. female, here for evaluation of the following chief complaint(s):  Discuss Labs      REVIEWED INFORMATION      Allergies   Allergen Reactions    Sulfa Antibiotics Hives and Swelling       Current Outpatient Medications   Medication Sig Dispense Refill    Ferrous Sulfate Dried ER (SLOW RELEASE IRON) 45 MG TBCR Take 1 tablet by mouth daily 90 tablet 1    aspirin 81 MG EC tablet Take 1 tablet by mouth daily 90 tablet 1    citalopram (CELEXA) 40 MG tablet Take 1 tablet by mouth daily 90 tablet 3    metFORMIN (GLUCOPHAGE-XR) 500 MG extended release tablet Take 2 tablets by mouth 2 times daily 360 tablet 1    hydroCHLOROthiazide (HYDRODIURIL) 25 MG tablet Take 1 tablet by mouth daily 90 tablet 1    simvastatin (ZOCOR) 40 MG tablet Take 1 tablet by mouth nightly 90 tablet 1    losartan (COZAAR) 100 MG tablet Take 1 tablet by mouth daily 90 tablet 1    glucose monitoring kit (FREESTYLE) monitoring kit 1 kit by Does not apply route daily 1 kit 0    Lancets MISC Check blood sugars once daily 100 each 3    blood glucose monitor strips Check blood sugars once daily 100 strip 3    losartan (COZAAR) 100 MG tablet Take 1 tablet by mouth daily (Patient not taking: Reported on 5/3/2021) 90 tablet 0    simvastatin (ZOCOR) 40 MG tablet Take 1 tablet by mouth every evening (Patient not taking: Reported on 2020) 90 tablet 1    fluticasone (FLONASE) 50 MCG/ACT nasal spray 1 spray by Each Nostril route daily (Patient not taking: Reported on 5/3/2021) 2 Bottle 1     No current facility-administered medications for this visit.          Patient Care Team:  MIGDALIA Hicks CNP as PCP - General (Nurse Practitioner)  MIGDALIA Hicks CNP as PCP - [facility] Provider  Erica Short MD as Consulting Physician (Endocrinology)  Deangelo Sauceda MD as Consulting Physician (Podiatry)    REVIEW OF SYSTEMS:     Review of Systems   Constitutional: Negative for activity change, fatigue and unexpected weight change. HENT: Negative for congestion, ear pain, hearing loss, rhinorrhea and sore throat. Respiratory: Negative for cough and shortness of breath. Cardiovascular: Negative for chest pain, palpitations and leg swelling. Gastrointestinal: Negative for constipation and diarrhea. Musculoskeletal: Negative for arthralgias and gait problem. Neurological: Negative for dizziness, weakness and headaches. Psychiatric/Behavioral: Negative for confusion. The patient is not nervous/anxious. HISTORY OF PRESENT ILLNESS           Thyroid level value is stable. Blood sugar is stable  Liver function is stable  Kidney function is stable  Blood counts are stable with no indication of anemia or low platelets. However do not low iron saturation, at 13% and normal low iron. H &H is ok, but recommend satrting  Iron supplemenation daily kelly recheck in 6 months     No changes to plan of care at this time unless addressed in this communication. Continue with any medications you are currently taking, with no change to dosages. I always encourage healthy life style practice including diet low in carbohydrates, hidden sugars, good amount of physical activity, stay hydrated, stress relieve strategies such as meditation and deep breathing, and get a good night sleep. Diabetes  She presents for her follow-up diabetic visit. She has type 2 diabetes mellitus. Her disease course has been stable. There are no hypoglycemic associated symptoms. Pertinent negatives for hypoglycemia include no confusion, dizziness, headaches or nervousness/anxiousness. There are no diabetic associated symptoms. Pertinent negatives for diabetes include no chest pain, no fatigue and no weakness. There are no hypoglycemic complications. Symptoms are stable.  There are no diabetic complications. Risk factors for coronary artery disease include dyslipidemia, hypertension, post-menopausal, sedentary lifestyle and stress. When asked about current treatments, none were reported. She is compliant with treatment all of the time. Her weight is stable. She is following a generally healthy diet. When asked about meal planning, she reported none. She has not had a previous visit with a dietitian. She participates in exercise weekly. Her overall blood glucose range is  mg/dl. An ACE inhibitor/angiotensin II receptor blocker is being taken. She sees a podiatrist.Eye exam is current. PHYSICAL EXAM:     /60   Pulse 72   Temp 97.8 °F (36.6 °C) (Temporal)   Wt 186 lb (84.4 kg)   LMP 06/21/2006   SpO2 98%   BMI 35.73 kg/m²      Physical Exam  Vitals signs reviewed. Constitutional:       Appearance: Normal appearance. She is not ill-appearing. Cardiovascular:      Rate and Rhythm: Normal rate and regular rhythm. Heart sounds: Murmur present. No friction rub. No gallop. Pulmonary:      Effort: Pulmonary effort is normal. No respiratory distress. Breath sounds: No wheezing. Abdominal:      General: Bowel sounds are normal.      Palpations: Abdomen is soft. Tenderness: There is no abdominal tenderness. Musculoskeletal:      Right lower leg: No edema. Left lower leg: No edema. Skin:     General: Skin is warm. Findings: No erythema, lesion or rash. Neurological:      Mental Status: She is alert. Psychiatric:         Mood and Affect: Mood normal.         Behavior: Behavior is cooperative. PROCEDURE/ IN OFFICE TESTING     No in office testing or procedures completed during today's office visit. ASSESSMENT/PLAN/ FOLLOWUP:     1. Type 2 diabetes mellitus without complication, without long-term current use of insulin ([facility])  -     Microalbumin, Ur; Future  -     Hemoglobin A1C; Future  -     CBC Auto Differential; Future  2. Thyroid enlarged  -     CBC; Future  -     TSH without Reflex; Future  3. Abnormal iron saturation  -     Ferrous Sulfate Dried ER (SLOW RELEASE IRON) 45 MG TBCR; Take 1 tablet by mouth daily, Disp-90 tablet, R-1Normal  -     CBC Auto Differential; Future  -     Iron and TIBC; Future  4. Hyperlipidemia, unspecified hyperlipidemia type  -     CBC; Future  -     Comprehensive Metabolic Panel, Fasting; Future  -     Lipid Panel; Future  5. Encounter for screening mammogram for malignant neoplasm of breast  -     DANII DIGITAL SCREEN W OR WO CAD BILATERAL; Future  6. Screen for colon cancer  -     Cologuard (For External Results Only); Future  7. Essential hypertension  -     Hemoglobin A1C; Future  -     CBC Auto Differential; Future  8. Dysmetabolic syndrome X  -     CBC; Future  -     Comprehensive Metabolic Panel, Fasting; Future  -     Lipid Panel; Future  9. Vitamin D deficiency  -     Vitamin D 25 Hydroxy; Future      Return in about 6 months (around 11/3/2021) for symptom check and lab review. COMMUNICATION:             The best way to find yourself is to lose yourself in the service of others - [address] [place] [address]. [address] [facility]   [email or web address]  Office: [phone]     An electronic signature was used to authenticate this note.   Signed by ACOSTA Correa on 5/4/2021 at 1:53 PM

## 2021-05-10 DIAGNOSIS — E78.5 HYPERLIPIDEMIA, UNSPECIFIED HYPERLIPIDEMIA TYPE: ICD-10-CM

## 2021-05-10 DIAGNOSIS — E88.81 DYSMETABOLIC SYNDROME X: ICD-10-CM

## 2021-05-10 DIAGNOSIS — I10 ESSENTIAL HYPERTENSION: ICD-10-CM

## 2021-05-10 NOTE — TELEPHONE ENCOUNTER
LOV 5/3/21  LRF  12/2/21 Metformin, Losartan Simvastatin 12/7/21  RTO 6 months    Health Maintenance   Topic Date Due    Cervical cancer screen  10/26/2019    Colon cancer screen colonoscopy  11/12/2020    Breast cancer screen  02/05/2021    Diabetic retinal exam  08/03/2021 (Originally 5/6/2020)    Diabetic foot exam  05/03/2022 (Originally 10/4/2019)    Shingles Vaccine (2 of 2) 05/03/2022 (Originally 3/17/2021)    Diabetic microalbuminuria test  08/12/2021    Lipid screen  08/12/2021    A1C test (Diabetic or Prediabetic)  01/21/2022    Potassium monitoring  01/21/2022    Creatinine monitoring  01/21/2022    DTaP/Tdap/Td vaccine (2 - Td) 02/27/2023    Flu vaccine  Completed    Pneumococcal 0-64 years Vaccine  Completed    COVID-19 Vaccine  Completed    Hepatitis C screen  Completed    HIV screen  Completed    Hepatitis A vaccine  Aged Out    Hib vaccine  Aged Out    Meningococcal (ACWY) vaccine  Aged Out             (applicable per patient's age: Cancer Screenings, Depression Screening, Fall Risk Screening, Immunizations)    Hemoglobin A1C (%)   Date Value   01/21/2021 6.1   08/12/2020 6.3   02/21/2019 5.7     Microalb/Crt.  Ratio (mcg/mg creat)   Date Value   10/04/2018 26 (H)     LDL Cholesterol (mg/dL)   Date Value   10/02/2018 115     LDL Calculated (mg/dL)   Date Value   08/12/2020 60     AST (U/L)   Date Value   08/12/2020 39     ALT (U/L)   Date Value   08/12/2020 44     BUN (mg/dL)   Date Value   01/21/2021 10      (goal A1C is < 7)   (goal LDL is <100) need 30-50% reduction from baseline     BP Readings from Last 3 Encounters:   05/03/21 104/60   12/16/20 112/74   09/15/20 132/84    (goal /80)      All Future Testing planned in [facility]:  Lab Frequency Next Occurrence   Culture, Throat Once 08/12/2021   Urinalysis Reflex to Culture Once 08/12/2021   CT GUIDED FNA Once 08/12/2021   XR ABDOMEN (KUB) (SINGLE AP VIEW) Once 08/12/2021   DANII DIGITAL SCREEN W OR WO CAD BILATERAL Once 05/03/2021   Cologuard (For External Results Only) Once 05/03/2021   CBC Once 11/03/2021   Comprehensive Metabolic Panel, Fasting Once 11/03/2021   Lipid Panel Once 11/03/2021   TSH without Reflex Once 11/03/2021   Vitamin D 25 Hydroxy Once 11/03/2021   Microalbumin, Ur Once 11/03/2021   Hemoglobin A1C Once 11/03/2021   CBC Auto Differential Once 11/03/2021   Iron and TIBC Once 11/03/2021       Next Visit Date:  Future Appointments   Date Time Provider Henry Spencer   11/3/2021  3:30 PM Michael Mancera APRN - CNP [facility]            Patient Active Problem List:     Dysmetabolic syndrome X     Essential hypertension     Hyperlipidemia     Atypical lobular hyperplasia of left breast     Nephrolithiasis     Major depressive disorder, single episode, mild (HCC)     Microalbuminuria     Type 2 diabetes mellitus without complication, without long-term current use of insulin ([address]

## 2021-05-11 RX ORDER — LOSARTAN POTASSIUM 100 MG/1
100 TABLET ORAL DAILY
Qty: 90 TABLET | Refills: 1 | Status: SHIPPED | OUTPATIENT
Start: 2021-05-11 | End: 2021-11-16

## 2021-05-11 RX ORDER — METFORMIN HYDROCHLORIDE 500 MG/1
1000 TABLET, EXTENDED RELEASE ORAL 2 TIMES DAILY
Qty: 360 TABLET | Refills: 1 | Status: SHIPPED | OUTPATIENT
Start: 2021-05-11 | End: 2021-11-16

## 2021-05-11 RX ORDER — SIMVASTATIN 40 MG
40 TABLET ORAL NIGHTLY
Qty: 90 TABLET | Refills: 1 | Status: SHIPPED | OUTPATIENT
Start: 2021-05-11 | End: 2021-11-16

## 2021-05-14 DIAGNOSIS — I10 ESSENTIAL HYPERTENSION: ICD-10-CM

## 2021-05-17 RX ORDER — HYDROCHLOROTHIAZIDE 25 MG/1
TABLET ORAL
Qty: 90 TABLET | Refills: 1 | Status: SHIPPED | OUTPATIENT
Start: 2021-05-17 | End: 2021-10-18

## 2021-05-17 NOTE — TELEPHONE ENCOUNTER
LOV 5/3/21  LRF 12/2/20  RTO 6 months    Health Maintenance   Topic Date Due    Cervical cancer screen  10/26/2019    Colon cancer screen colonoscopy  11/12/2020    Breast cancer screen  02/05/2021    Diabetic retinal exam  08/03/2021 (Originally 5/6/2020)    Diabetic foot exam  05/03/2022 (Originally 10/4/2019)    Shingles Vaccine (2 of 2) 05/03/2022 (Originally 3/17/2021)    Diabetic microalbuminuria test  08/12/2021    Lipid screen  08/12/2021    A1C test (Diabetic or Prediabetic)  01/21/2022    Potassium monitoring  01/21/2022    Creatinine monitoring  01/21/2022    DTaP/Tdap/Td vaccine (2 - Td) 02/27/2023    Pneumococcal 0-64 years Vaccine (2 of 2) 02/25/2024    Flu vaccine  Completed    COVID-19 Vaccine  Completed    Hepatitis C screen  Completed    HIV screen  Completed    Hepatitis A vaccine  Aged Out    Hib vaccine  Aged Out    Meningococcal (ACWY) vaccine  Aged Out             (applicable per patient's age: Cancer Screenings, Depression Screening, Fall Risk Screening, Immunizations)    Hemoglobin A1C (%)   Date Value   01/21/2021 6.1   08/12/2020 6.3   02/21/2019 5.7     Microalb/Crt.  Ratio (mcg/mg creat)   Date Value   10/04/2018 26 (H)     LDL Cholesterol (mg/dL)   Date Value   10/02/2018 115     LDL Calculated (mg/dL)   Date Value   08/12/2020 60     AST (U/L)   Date Value   08/12/2020 39     ALT (U/L)   Date Value   08/12/2020 44     BUN (mg/dL)   Date Value   01/21/2021 10      (goal A1C is < 7)   (goal LDL is <100) need 30-50% reduction from baseline     BP Readings from Last 3 Encounters:   05/03/21 104/60   12/16/20 112/74   09/15/20 132/84    (goal /80)      All Future Testing planned in [facility]:  Lab Frequency Next Occurrence   Culture, Throat Once 08/12/2021   Urinalysis Reflex to Culture Once 08/12/2021   CT GUIDED FNA Once 08/12/2021   XR ABDOMEN (KUB) (SINGLE AP VIEW) Once 08/12/2021   DANII DIGITAL SCREEN W OR WO CAD BILATERAL Once 05/03/2021   Cologuard (For External Results Only) Once 05/03/2021   CBC Once 11/03/2021   Comprehensive Metabolic Panel, Fasting Once 11/03/2021   Lipid Panel Once 11/03/2021   TSH without Reflex Once 11/03/2021   Vitamin D 25 Hydroxy Once 11/03/2021   Microalbumin, Ur Once 11/03/2021   Hemoglobin A1C Once 11/03/2021   CBC Auto Differential Once 11/03/2021   Iron and TIBC Once 11/03/2021       Next Visit Date:  Future Appointments   Date Time Provider Henry Spencer   11/3/2021  3:30 PM Andriy Bhatt, APRN - [facility] [place] [facility]            Patient Active Problem List:     Dysmetabolic syndrome X     Essential hypertension     Hyperlipidemia     Atypical lobular hyperplasia of left breast     Nephrolithiasis     Major depressive disorder, single episode, mild (HCC)     Microalbuminuria     Type 2 diabetes mellitus without complication, without long-term current use of insulin ([facility] [address]

## 2021-08-24 DIAGNOSIS — Z12.11 SCREEN FOR COLON CANCER: ICD-10-CM

## 2021-09-29 ENCOUNTER — OFFICE VISIT (OUTPATIENT)
Dept: FAMILY MEDICINE CLINIC | Age: 66
End: 2021-09-29
Payer: COMMERCIAL

## 2021-09-29 VITALS
WEIGHT: 195 LBS | HEIGHT: 61 IN | DIASTOLIC BLOOD PRESSURE: 80 MMHG | HEART RATE: 68 BPM | OXYGEN SATURATION: 98 % | TEMPERATURE: 97.8 F | SYSTOLIC BLOOD PRESSURE: 132 MMHG | BODY MASS INDEX: 36.82 KG/M2

## 2021-09-29 DIAGNOSIS — R94.31 ACUTE ELECTROCARDIOGRAM CHANGES: ICD-10-CM

## 2021-09-29 DIAGNOSIS — B96.89 ACUTE BACTERIAL SINUSITIS: ICD-10-CM

## 2021-09-29 DIAGNOSIS — J01.90 ACUTE BACTERIAL SINUSITIS: ICD-10-CM

## 2021-09-29 DIAGNOSIS — R07.9 CHEST PAIN, UNSPECIFIED TYPE: Primary | ICD-10-CM

## 2021-09-29 PROCEDURE — 93000 ELECTROCARDIOGRAM COMPLETE: CPT | Performed by: NURSE PRACTITIONER

## 2021-09-29 PROCEDURE — 99214 OFFICE O/P EST MOD 30 MIN: CPT | Performed by: NURSE PRACTITIONER

## 2021-09-29 RX ORDER — ASPIRIN 325 MG
325 TABLET ORAL DAILY
Qty: 30 TABLET | Refills: 3 | Status: SHIPPED | OUTPATIENT
Start: 2021-09-29

## 2021-09-29 RX ORDER — METHYLPREDNISOLONE 4 MG/1
TABLET ORAL
Qty: 1 KIT | Refills: 0 | Status: SHIPPED | OUTPATIENT
Start: 2021-09-29 | End: 2021-10-05

## 2021-09-29 RX ORDER — AZITHROMYCIN 250 MG/1
250 TABLET, FILM COATED ORAL SEE ADMIN INSTRUCTIONS
Qty: 6 TABLET | Refills: 0 | Status: SHIPPED | OUTPATIENT
Start: 2021-09-29 | End: 2021-10-04

## 2021-09-29 RX ORDER — FLUTICASONE PROPIONATE 50 MCG
2 SPRAY, SUSPENSION (ML) NASAL DAILY
Qty: 48 G | Refills: 1 | Status: SHIPPED | OUTPATIENT
Start: 2021-09-29

## 2021-09-29 ASSESSMENT — ENCOUNTER SYMPTOMS
SHORTNESS OF BREATH: 0
SINUS PRESSURE: 1
DIARRHEA: 0
SINUS PAIN: 1
SORE THROAT: 0
CONSTIPATION: 0
COUGH: 1
RHINORRHEA: 0

## 2021-09-29 NOTE — PROGRESS NOTES
[address] [facility]   [address]  [phone]    2021     CHIEF COMPLAINT:     Rosey Brito (:  1955) is a 61 y.o. female, here for evaluation of the following chief complaint(s):  Sinus Problem      REVIEWED INFORMATION      Allergies   Allergen Reactions    Sulfa Antibiotics Hives and Swelling       Current Outpatient Medications   Medication Sig Dispense Refill    fluticasone (FLONASE) 50 MCG/ACT nasal spray 2 sprays by Each Nostril route daily 48 g 1    aspirin (JOVANA ASPIRIN) 325 MG tablet Take 1 tablet by mouth daily 30 tablet 3    metFORMIN (GLUCOPHAGE-XR) 500 MG extended release tablet Take 2 tablets by mouth 2 times daily 360 tablet 1    losartan (COZAAR) 100 MG tablet Take 1 tablet by mouth daily 90 tablet 1    simvastatin (ZOCOR) 40 MG tablet Take 1 tablet by mouth nightly 90 tablet 1    citalopram (CELEXA) 40 MG tablet Take 1 tablet by mouth daily 90 tablet 3    glucose monitoring kit (FREESTYLE) monitoring kit 1 kit by Does not apply route daily 1 kit 0    Lancets MISC Check blood sugars once daily 100 each 3    blood glucose monitor strips Check blood sugars once daily 100 strip 3    hydroCHLOROthiazide (HYDRODIURIL) 25 MG tablet TAKE 1 TABLET DAILY 90 tablet 1    cetirizine-psuedoephedrine (ZYRTEC-D) 5-120 MG per extended release tablet Take 1 tablet by mouth 2 times daily for 10 days 20 tablet 0    amoxicillin-clavulanate (AUGMENTIN) 875-125 MG per tablet Take 1 tablet by mouth 2 times daily for 7 days 14 tablet 0    Ferrous Sulfate Dried ER (SLOW RELEASE IRON) 45 MG TBCR Take 1 tablet by mouth daily 90 tablet 1    fluticasone (FLONASE) 50 MCG/ACT nasal spray 1 spray by Each Nostril route daily (Patient not taking: Reported on 10/11/2021) 2 Bottle 1     No current facility-administered medications for this visit.         Patient Care Team:  MIGDALIA Steen - CNP as PCP - General (Nurse Practitioner)  MIGDALIA Steen - CNP as PCP - [facility] [facility]  Amrit Smith MD as Consulting Physician (Endocrinology)  Callum Gracia MD as Consulting Physician (Podiatry)    REVIEW OF SYSTEMS:     Review of Systems   Constitutional: Negative for activity change, fatigue and unexpected weight change. HENT: Positive for congestion, postnasal drip, sinus pressure and sinus pain. Negative for ear pain, hearing loss, rhinorrhea and sore throat. Respiratory: Positive for cough. Negative for shortness of breath. Cardiovascular: Negative for chest pain, palpitations and leg swelling. Gastrointestinal: Negative for constipation and diarrhea. Musculoskeletal: Negative for arthralgias and gait problem. Upper back pain with pressure across the chest   Neurological: Negative for dizziness, weakness and headaches. Psychiatric/Behavioral: Positive for sleep disturbance (vivid dreams). Negative for confusion. The patient is not nervous/anxious. HISTORY OF PRESENT ILLNESS         Sinus for 3 weeks - otc tylenol sinus, suzan pot, with mild improvement but not going away     Blood sugars were stable last time checked    patiern reports neck pain and chest pressure over the last 3 weeks as well. PHYSICAL EXAM:     /80   Pulse 68   Temp 97.8 °F (36.6 °C) (Temporal)   Ht 5' 1\" (1.549 m)   Wt 195 lb (88.5 kg)   LMP 06/21/2006   SpO2 98%   BMI 36.84 kg/m²      Physical Exam  Vitals reviewed. Constitutional:       Appearance: Normal appearance. She is not ill-appearing. HENT:      Head: Raccoon eyes present. Right Ear: Tympanic membrane is bulging. Left Ear: Tympanic membrane is bulging. Nose: Congestion present. Right Turbinates: Enlarged and swollen. Left Turbinates: Enlarged and swollen. Cardiovascular:      Rate and Rhythm: Normal rate and regular rhythm. Heart sounds: No murmur heard. No friction rub. No gallop.     Pulmonary:      Effort: Pulmonary effort is normal. No tablet; Take 1 tablet by mouth daily, Disp-30 tablet, R-3Normal      Return if symptoms worsen or fail to improve schedule when Stress test completed for return. COMMUNICATION:       On this date 9/29/2021 I have spent 40 minutes reviewing previous notes, test results and face to face with the patient discussing the diagnosis and importance of compliance with the treatment plan as well as documenting on the day of the visit. The best way to find yourself is to lose yourself in the service of others - [address] [place] [address]. [address] [place] [address]   [email or web address]  Office: [phone]     An electronic signature was used to authenticate this note.   Signed by MIGDALIA Alicea CNP, APRN-CNP on 10/18/2021 at 10:50 PM

## 2021-09-29 NOTE — PATIENT INSTRUCTIONS
PLEASE NOTE THAT ANY DISCONTINUATION OF MEDICATIONS OR MEDICAL SUPPLIES REFLECTED IN TODAY'S VISIT SUMMARY  MAY NOT HAVE COMPLETED AS A CHANGE IN YOUR PLAN OF CARE. THESE CHANGES MAY HAVE ONLY BEEN DONE SO IN ORDER TO CLEAN UP LIST FROM DUPLICATIONS OR MISCELLANEOUS SUPPLIES ONLY NEEDED PERIODIC REORDERS. DO NOT DISCONTINUE MEDICATIONS LISTED UNLESS SPECIFICALLY DISCUSSED IN YOUR APPOINTMENT WITH PROVIDER OR SPECIALIST, IF YOU HAVE AN QUESTIONS, PLEASE CONTACT YOUR PROVIDER FOR CLARIFICATION IF NOT ADDRESSED IN YOUR PLAN OF CARE. It was my pleasure to meet with you today. Please contact me with any questions or concerns, and please notify myself or our manager if there is anyway we can improve our service in your health care needs. Below I have listed some instructions and information that pertain to today's visit.    -You have been advised to continue all current medication, otherwise not discussed in today's visit  -New medications and refills will been sent and made available at pharmacy or mail away  -Heathy daily diet to include healthy balanced diet with good portions of lean meats and vegetables  -Drink 6-8 glasses of water daily  -Complete  fasting (8-12 hours - water, black coffee, plain tea ok) labs and/any other testing ordered prior to next scheduled followup           NeilMed NasoGel for Dry Noses    Sodium HyaluronateAloe Vera NasoGel provides moisture to hydrate and lubricate dry and irritated nasal passages caused by dry climate and indoor heat; It helps reduce nasal dryness experienced during air travel, oxygen and CPAP use, as well as dryness symptoms caused by atrophic rhinitis, post radiation therapy and sinus surgery. Suggested Use Apply a small amount of NasoGEL into each nostril every 4 to 6 hours.

## 2021-10-11 ENCOUNTER — NURSE TRIAGE (OUTPATIENT)
Dept: OTHER | Facility: CLINIC | Age: 66
End: 2021-10-11

## 2021-10-11 ENCOUNTER — OFFICE VISIT (OUTPATIENT)
Dept: FAMILY MEDICINE CLINIC | Age: 66
End: 2021-10-11
Payer: COMMERCIAL

## 2021-10-11 VITALS
SYSTOLIC BLOOD PRESSURE: 124 MMHG | TEMPERATURE: 98 F | HEART RATE: 84 BPM | OXYGEN SATURATION: 98 % | BODY MASS INDEX: 36.47 KG/M2 | WEIGHT: 193 LBS | DIASTOLIC BLOOD PRESSURE: 84 MMHG

## 2021-10-11 DIAGNOSIS — J02.9 SORE THROAT: Primary | ICD-10-CM

## 2021-10-11 DIAGNOSIS — J01.90 ACUTE BACTERIAL SINUSITIS: ICD-10-CM

## 2021-10-11 DIAGNOSIS — B96.89 ACUTE BACTERIAL SINUSITIS: ICD-10-CM

## 2021-10-11 LAB — S PYO AG THROAT QL: NORMAL

## 2021-10-11 PROCEDURE — 99214 OFFICE O/P EST MOD 30 MIN: CPT | Performed by: NURSE PRACTITIONER

## 2021-10-11 PROCEDURE — 87880 STREP A ASSAY W/OPTIC: CPT | Performed by: NURSE PRACTITIONER

## 2021-10-11 RX ORDER — AMOXICILLIN AND CLAVULANATE POTASSIUM 875; 125 MG/1; MG/1
1 TABLET, FILM COATED ORAL 2 TIMES DAILY
Qty: 14 TABLET | Refills: 0 | Status: SHIPPED | OUTPATIENT
Start: 2021-10-11 | End: 2021-10-18

## 2021-10-11 NOTE — PROGRESS NOTES
[address] [facility]   [address]  [phone]    10/11/2021     CHIEF COMPLAINT:     Monica Varner (:  1955) is a 61 y.o. female, here for evaluation of the following chief complaint(s):  Pharyngitis and Sinusitis      REVIEWED INFORMATION      Allergies   Allergen Reactions    Sulfa Antibiotics Hives and Swelling       Current Outpatient Medications   Medication Sig Dispense Refill    fluticasone (FLONASE) 50 MCG/ACT nasal spray 2 sprays by Each Nostril route daily 48 g 1    aspirin (JOVANA ASPIRIN) 325 MG tablet Take 1 tablet by mouth daily 30 tablet 3    metFORMIN (GLUCOPHAGE-XR) 500 MG extended release tablet Take 2 tablets by mouth 2 times daily 360 tablet 1    losartan (COZAAR) 100 MG tablet Take 1 tablet by mouth daily 90 tablet 1    simvastatin (ZOCOR) 40 MG tablet Take 1 tablet by mouth nightly 90 tablet 1    Ferrous Sulfate Dried ER (SLOW RELEASE IRON) 45 MG TBCR Take 1 tablet by mouth daily 90 tablet 1    citalopram (CELEXA) 40 MG tablet Take 1 tablet by mouth daily 90 tablet 3    glucose monitoring kit (FREESTYLE) monitoring kit 1 kit by Does not apply route daily 1 kit 0    Lancets MISC Check blood sugars once daily 100 each 3    blood glucose monitor strips Check blood sugars once daily 100 strip 3    hydroCHLOROthiazide (HYDRODIURIL) 25 MG tablet TAKE 1 TABLET DAILY 90 tablet 1    cetirizine-psuedoephedrine (ZYRTEC-D) 5-120 MG per extended release tablet Take 1 tablet by mouth 2 times daily for 10 days 20 tablet 0    fluticasone (FLONASE) 50 MCG/ACT nasal spray 1 spray by Each Nostril route daily (Patient not taking: Reported on 10/11/2021) 2 Bottle 1     No current facility-administered medications for this visit.         Patient Care Team:  MIGDALIA Chávez CNP as PCP - General (Nurse Practitioner)  MIGDALIA Chávez CNP as [facility]CP - [facility]  Jena Odonnell MD as Consulting Physician (Endocrinology)  Jayshree Luo Brent Whaley MD as Consulting Physician (Podiatry)    REVIEW OF SYSTEMS:     Review of Systems   Constitutional: Positive for fatigue. Negative for activity change and unexpected weight change. HENT: Positive for congestion, ear pain, postnasal drip, sinus pressure and sinus pain. Negative for hearing loss, rhinorrhea and sore throat. Respiratory: Negative for cough and shortness of breath. Cardiovascular: Negative for chest pain, palpitations and leg swelling. Gastrointestinal: Negative for constipation and diarrhea. Musculoskeletal: Negative for arthralgias and gait problem. Neurological: Negative for dizziness, weakness and headaches. Psychiatric/Behavioral: Negative for confusion. The patient is not nervous/anxious. HISTORY OF PRESENT ILLNESS     Pharyngitis  This is a new problem. The current episode started 1 to 4 weeks ago. The problem occurs constantly. The problem has been rapidly worsening. Associated symptoms include congestion and fatigue. Pertinent negatives include no arthralgias, chest pain, coughing, headaches, sore throat or weakness. Nothing aggravates the symptoms. She has tried NSAIDs for the symptoms. The treatment provided no relief. Sinusitis  This is a chronic problem. The current episode started 1 to 4 weeks ago. The problem has been gradually worsening since onset. There has been no fever. The pain is moderate. Associated symptoms include congestion, ear pain and sinus pressure. Pertinent negatives include no coughing, headaches, shortness of breath or sore throat. Past treatments include spray decongestants and lying down. The treatment provided mild relief. PHYSICAL EXAM:     /84   Pulse 84   Temp 98 °F (36.7 °C) (Temporal)   Wt 193 lb (87.5 kg)   LMP 06/21/2006   SpO2 98%   BMI 36.47 kg/m²      Physical Exam  Vitals reviewed. Constitutional:       Appearance: Normal appearance. She is not ill-appearing.    HENT:      Right Ear: A middle ear effusion is present. Tympanic membrane is bulging. Left Ear: A middle ear effusion is present. Tympanic membrane is bulging. Nose:      Right Turbinates: Enlarged and swollen. Left Turbinates: Enlarged and swollen. Right Sinus: Maxillary sinus tenderness and frontal sinus tenderness present. Left Sinus: Maxillary sinus tenderness and frontal sinus tenderness present. Mouth/Throat:      Pharynx: Oropharyngeal exudate and posterior oropharyngeal erythema present. Cardiovascular:      Rate and Rhythm: Normal rate and regular rhythm. Heart sounds: No murmur heard. No friction rub. No gallop. Pulmonary:      Effort: Pulmonary effort is normal. No respiratory distress. Breath sounds: No wheezing. Abdominal:      General: Bowel sounds are normal.      Palpations: Abdomen is soft. Tenderness: There is no abdominal tenderness. Musculoskeletal:      Right lower leg: No edema. Left lower leg: No edema. Skin:     General: Skin is warm. Findings: No erythema, lesion or rash. Neurological:      Mental Status: She is alert. Psychiatric:         Mood and Affect: Mood normal.         Behavior: Behavior is cooperative. PROCEDURE/ IN OFFICE TESTING/ LAB REVIEW     No in office testing or procedures completed during today's office visit. ASSESSMENT/PLAN/ FOLLOWUP:     PLEASE NOTE THAT ANY DISCONTINUATION OF MEDICATIONS OR MEDICAL SUPPLIES REFLECTED IN TODAY'S VISIT SUMMARY  MAY NOT HAVE COMPLETED AS A CHANGE IN YOUR PLAN OF CARE. THESE CHANGES MAY HAVE ONLY BEEN DONE SO IN ORDER TO CLEAN UP LIST FROM DUPLICATIONS OR MISCELLANEOUS SUPPLIES ONLY NEEDED PERIODIC REORDERS. DO NOT DISCONTINUE MEDICATIONS LISTED UNLESS SPECIFICALLY DISCUSSED IN YOUR APPOINTMENT WITH PROVIDER OR SPECIALIST, IF YOU HAVE AN QUESTIONS, PLEASE CONTACT YOUR PROVIDER FOR CLARIFICATION IF NOT ADDRESSED IN YOUR PLAN OF CARE.      1. Sore throat  -     POCT rapid strep A  - amoxicillin-clavulanate (AUGMENTIN) 875-125 MG per tablet; Take 1 tablet by mouth 2 times daily for 7 days, Disp-14 tablet, R-0Normal  2. Acute bacterial sinusitis  -     amoxicillin-clavulanate (AUGMENTIN) 875-125 MG per tablet; Take 1 tablet by mouth 2 times daily for 7 days, Disp-14 tablet, R-0Normal      No follow-ups on file. COMMUNICATION:             The best way to find yourself is to lose yourself in the service of others - [address] [place] [address]. [address] [facility]   [email or web address]  Office: [phone]     An electronic signature was used to authenticate this note.   Signed by MIGDALIA Shepard Cea, CNP, APRN-CNP on 10/24/2021 at 6:26 PM

## 2021-10-11 NOTE — TELEPHONE ENCOUNTER
Received call from [facility] at [facility] with Red Flag Complaint. Brief description of triage:   Sinus infection, sore throat    Triage indicates for patient to be seen today in the office,patient encouraged to go to the [facility] if no available appointments     Care advice provided, patient verbalizes understanding; denies any other questions or concerns; instructed to call back for any new or worsening symptoms. Writer provided warm transfer to [facility] at [facility] for appointment scheduling. Attention Provider: Thank you for allowing me to participate in the care of your patient. The patient was connected to triage in response to information provided to the ECC/PSC. Please do not respond through this encounter as the response is not directed to a shared pool. Reason for Disposition   Patient wants to be seen    Answer Assessment - Initial Assessment Questions  1. ONSET: \"When did the throat start hurting? \" (Hours or days ago)       A week ago    2. SEVERITY: \"How bad is the sore throat? \" (Scale 1-10; mild, moderate or severe)    - MILD (1-3):  doesn't interfere with eating or normal activities    - MODERATE (4-7): interferes with eating some solids and normal activities    - SEVERE (8-10):  excruciating pain, interferes with most normal activities    - SEVERE DYSPHAGIA: can't swallow liquids, drooling      Mild    3. STREP EXPOSURE: \"Has there been any exposure to strep within the past week? \" If so, ask: \"What type of contact occurred? \"       No    4. VIRAL SYMPTOMS: Jeppie Sensor there any symptoms of a cold, such as a runny nose, cough, hoarse voice or red eyes? \"       Runny nose, cough    5. FEVER: \"Do you have a fever? \" If so, ask: \"What is your temperature, how was it measured, and when did it start? \"      No    6. PUS ON THE TONSILS: \"Is there pus on the tonsils in the back of your throat? \"      Her tongue is white, states that it feels like there are sores    7.  OTHER SYMPTOMS: \"Do you have any other symptoms? \" (e.g., difficulty breathing, headache, rash)      Non stop headache    8. PREGNANCY: \"Is there any chance you are pregnant? \" \"When was your last menstrual period? \"      n/a    Protocols used: SORE THROAT-ADULT-OH

## 2021-10-13 ENCOUNTER — TELEPHONE (OUTPATIENT)
Dept: FAMILY MEDICINE CLINIC | Age: 66
End: 2021-10-13

## 2021-10-13 DIAGNOSIS — J01.90 ACUTE BACTERIAL SINUSITIS: Primary | ICD-10-CM

## 2021-10-13 DIAGNOSIS — B96.89 ACUTE BACTERIAL SINUSITIS: Primary | ICD-10-CM

## 2021-10-13 NOTE — TELEPHONE ENCOUNTER
----- Message from June Perera sent at 10/13/2021 10:07 AM EDT -----  Subject: Message to Provider    QUESTIONS  Information for Provider? PT is wondering if there is a different   medication that can be called in - her insurance will not cover the   Phenylephrine-Acetaminophen 5-325 MG TABS. ---------------------------------------------------------------------------  --------------  Stiven POP  What is the best way for the office to contact you? OK to leave message on   voicemail  Preferred Call Back Phone Number? [phone]  ---------------------------------------------------------------------------  --------------  SCRIPT ANSWERS  Relationship to Patient?  Self

## 2021-10-14 RX ORDER — CETIRIZINE HYDROCHLORIDE, PSEUDOEPHEDRINE HYDROCHLORIDE 5; 120 MG/1; MG/1
1 TABLET, FILM COATED, EXTENDED RELEASE ORAL 2 TIMES DAILY
Qty: 20 TABLET | Refills: 0 | Status: SHIPPED | OUTPATIENT
Start: 2021-10-14 | End: 2021-10-24

## 2021-10-14 NOTE — TELEPHONE ENCOUNTER
Typically decongestants can be purchased over the counter - some insurances will cover. I am unsure if her insurance will cover any decongestant as there no information regarding coverage. Zyrtec D to be taken 2x daily for the next 10 days to see if this will be covered.

## 2021-10-15 DIAGNOSIS — J02.9 ACUTE VIRAL PHARYNGITIS: ICD-10-CM

## 2021-10-15 DIAGNOSIS — R09.82 POST-NASAL DRIP: ICD-10-CM

## 2021-10-17 DIAGNOSIS — I10 ESSENTIAL HYPERTENSION: ICD-10-CM

## 2021-10-18 RX ORDER — HYDROCHLOROTHIAZIDE 25 MG/1
TABLET ORAL
Qty: 90 TABLET | Refills: 1 | Status: SHIPPED | OUTPATIENT
Start: 2021-10-18 | End: 2022-03-01 | Stop reason: SDUPTHER

## 2021-10-18 NOTE — TELEPHONE ENCOUNTER
LOV 10/11/21  LRF 5/17/21  RTO     Health Maintenance   Topic Date Due    Breast cancer screen  02/05/2021    Diabetic microalbuminuria test  08/12/2021    Lipid screen  08/12/2021    Diabetic retinal exam  12/01/2021 (Originally 5/6/2020)    Cervical cancer screen  12/01/2021 (Originally 10/26/2019)    Diabetic foot exam  05/03/2022 (Originally 10/4/2019)    Flu vaccine (1) 09/29/2022 (Originally 9/1/2021)    A1C test (Diabetic or Prediabetic)  01/21/2022    Potassium monitoring  01/21/2022    Creatinine monitoring  01/21/2022    DTaP/Tdap/Td vaccine (2 - Td or Tdap) 02/27/2023    Pneumococcal 0-64 years Vaccine (2 of 2 - PPSV23) 02/25/2024    Colon cancer screen fecal DNA test (Cologuard)  08/19/2024    Shingles Vaccine  Completed    COVID-19 Vaccine  Completed    Hepatitis C screen  Completed    HIV screen  Completed    Hepatitis A vaccine  Aged Out    Hib vaccine  Aged Out    Meningococcal (ACWY) vaccine  Aged Out             (applicable per patient's age: Cancer Screenings, Depression Screening, Fall Risk Screening, Immunizations)    Hemoglobin A1C (%)   Date Value   01/21/2021 6.1   08/12/2020 6.3   02/21/2019 5.7     Microalb/Crt.  Ratio (mcg/mg creat)   Date Value   10/04/2018 26 (H)     LDL Cholesterol (mg/dL)   Date Value   10/02/2018 115     LDL Calculated (mg/dL)   Date Value   08/12/2020 60     AST (U/L)   Date Value   08/12/2020 39     ALT (U/L)   Date Value   08/12/2020 44     BUN (mg/dL)   Date Value   01/21/2021 10      (goal A1C is < 7)   (goal LDL is <100) need 30-50% reduction from baseline     BP Readings from Last 3 Encounters:   10/11/21 124/84   09/29/21 132/80   05/03/21 104/60    (goal /80)      All Future Testing planned in [facility]:  Lab Frequency Next Occurrence   Urinalysis Reflex to Culture Once 11/20/2021   XR ABDOMEN (KUB) (SINGLE AP VIEW) Once 12/01/2021   DANII DIGITAL SCREEN W OR WO CAD BILATERAL Once 07/03/2022   CBC Once 11/03/2021   Comprehensive Metabolic Panel, Fasting Once 11/03/2021   Lipid Panel Once 11/03/2021   TSH without Reflex Once 11/03/2021   Vitamin D 25 Hydroxy Once 11/03/2021   Microalbumin, Ur Once 11/03/2021   Hemoglobin A1C Once 11/03/2021   CBC Auto Differential Once 11/03/2021   Iron and TIBC Once 11/03/2021   CARDIAC STRESS TEST EXERCISE ONLY Once 09/30/2021       Next Visit Date:  Future Appointments   Date Time Provider Henry Spencer   11/3/2021  3:30 PM Ely Bernheim, APRN - [facility] [place] [address] [facility]            Patient Active Problem List:     Dysmetabolic syndrome X     Essential hypertension     Hyperlipidemia     Atypical lobular hyperplasia of left breast     Nephrolithiasis     Major depressive disorder, single episode, mild (HCC)     Microalbuminuria     Type 2 diabetes mellitus without complication, without long-term current use of insulin ([facility] [address]

## 2021-10-22 ENCOUNTER — TELEPHONE (OUTPATIENT)
Dept: FAMILY MEDICINE CLINIC | Age: 66
End: 2021-10-22

## 2021-10-22 NOTE — TELEPHONE ENCOUNTER
----- Message from Leland Mauricio sent at 10/20/2021  3:51 PM EDT -----  Subject: Message to Provider    QUESTIONS  Information for Provider? Nelson Oconnor stated she has been waiting on a   call for her to get and Stress Test no one has called her about the   appointment yet please contact her  ---------------------------------------------------------------------------  --------------  [address]  What is the best way for the office to contact you? OK to leave message on   voicemail  Preferred Call Back Phone Number?  [phone]  ---------------------------------------------------------------------------  --------------  SCRIPT ANSWERS  undefined

## 2021-10-24 ASSESSMENT — ENCOUNTER SYMPTOMS
COUGH: 0
SHORTNESS OF BREATH: 0
CONSTIPATION: 0
SINUS PAIN: 1
SINUS PRESSURE: 1
RHINORRHEA: 0
SORE THROAT: 0
DIARRHEA: 0

## 2021-11-03 DIAGNOSIS — R07.9 CHEST PAIN, UNSPECIFIED TYPE: ICD-10-CM

## 2021-11-03 DIAGNOSIS — B96.89 ACUTE BACTERIAL SINUSITIS: ICD-10-CM

## 2021-11-03 DIAGNOSIS — J01.90 ACUTE BACTERIAL SINUSITIS: ICD-10-CM

## 2021-11-12 LAB
ALBUMIN SERPL-MCNC: 4.4 G/DL
ALP BLD-CCNC: 61 U/L
ALT SERPL-CCNC: 21 U/L
AST SERPL-CCNC: 22 U/L
AVERAGE GLUCOSE: 131
BASOPHILS ABSOLUTE: 0.1 /ΜL
BASOPHILS RELATIVE PERCENT: 0.9 %
BILIRUB SERPL-MCNC: 0.6 MG/DL (ref 0.1–1.4)
BUN BLDV-MCNC: 14 MG/DL
CALCIUM SERPL-MCNC: 10.3 MG/DL
CHLORIDE BLD-SCNC: 101 MMOL/L
CHOLESTEROL, TOTAL: 164 MG/DL
CHOLESTEROL/HDL RATIO: 2.4
CO2: 28 MMOL/L
CREAT SERPL-MCNC: 0.67 MG/DL
CREATININE URINE: 171.04 MG/DL
EOSINOPHILS ABSOLUTE: 0.3 /ΜL
EOSINOPHILS RELATIVE PERCENT: 3.8 %
GLUCOSE FASTING: 100 MG/DL
HBA1C MFR BLD: 6.2 %
HCT VFR BLD CALC: 41.6 % (ref 36–46)
HDLC SERPL-MCNC: 67 MG/DL (ref 35–70)
HEMOGLOBIN: 13.9 G/DL (ref 12–16)
IRON: 78
LDL CHOLESTEROL CALCULATED: 70 MG/DL (ref 0–160)
LYMPHOCYTES ABSOLUTE: 2.4 /ΜL
LYMPHOCYTES RELATIVE PERCENT: 36.1 %
MCH RBC QN AUTO: 31.1 PG
MCHC RBC AUTO-ENTMCNC: 33.4 G/DL
MCV RBC AUTO: 93 FL
MICROALBUMIN/CREAT 24H UR: 38.6 MG/G{CREAT}
MONOCYTES ABSOLUTE: 0.5 /ΜL
MONOCYTES RELATIVE PERCENT: 8.1 %
NEUTROPHILS ABSOLUTE: 3.4 /ΜL
NEUTROPHILS RELATIVE PERCENT: 51.1 %
NONHDLC SERPL-MCNC: NORMAL MG/DL
PLATELET # BLD: 237 K/ΜL
PMV BLD AUTO: 7.6 FL
POTASSIUM SERPL-SCNC: 3.9 MMOL/L
RBC # BLD: 4.46 10^6/ΜL
SODIUM BLD-SCNC: 140 MMOL/L
TOTAL IRON BINDING CAPACITY: 431
TOTAL PROTEIN: 6.8 G/DL (ref 6.4–8.2)
TRIGL SERPL-MCNC: 137 MG/DL
TSH SERPL DL<=0.05 MIU/L-ACNC: 2.06 UIU/ML
VITAMIN D 25-HYDROXY: 46.5
VITAMIN D2, 25 HYDROXY: NORMAL
VITAMIN D3,25 HYDROXY: NORMAL
VLDLC SERPL CALC-MCNC: 27 MG/DL
WBC # BLD: 6.7 10^3/ML

## 2021-11-15 DIAGNOSIS — E88.81 DYSMETABOLIC SYNDROME X: ICD-10-CM

## 2021-11-15 DIAGNOSIS — E78.5 HYPERLIPIDEMIA, UNSPECIFIED HYPERLIPIDEMIA TYPE: ICD-10-CM

## 2021-11-15 DIAGNOSIS — F41.1 GENERALIZED ANXIETY DISORDER: ICD-10-CM

## 2021-11-15 DIAGNOSIS — I10 ESSENTIAL HYPERTENSION: ICD-10-CM

## 2021-11-15 DIAGNOSIS — F32.0 MAJOR DEPRESSIVE DISORDER, SINGLE EPISODE, MILD (HCC): ICD-10-CM

## 2021-11-15 LAB — MAMMOGRAPHY, EXTERNAL: NORMAL

## 2021-11-16 RX ORDER — SIMVASTATIN 40 MG
40 TABLET ORAL NIGHTLY
Qty: 90 TABLET | Refills: 1 | Status: SHIPPED | OUTPATIENT
Start: 2021-11-16 | End: 2022-02-24 | Stop reason: SDUPTHER

## 2021-11-16 RX ORDER — METFORMIN HYDROCHLORIDE 500 MG/1
1000 TABLET, EXTENDED RELEASE ORAL 2 TIMES DAILY WITH MEALS
Qty: 360 TABLET | Refills: 1 | Status: SHIPPED | OUTPATIENT
Start: 2021-11-16 | End: 2022-02-24 | Stop reason: SDUPTHER

## 2021-11-16 RX ORDER — LOSARTAN POTASSIUM 100 MG/1
100 TABLET ORAL DAILY
Qty: 90 TABLET | Refills: 1 | Status: SHIPPED | OUTPATIENT
Start: 2021-11-16 | End: 2022-02-24

## 2021-11-16 RX ORDER — CITALOPRAM 40 MG/1
40 TABLET ORAL DAILY
Qty: 90 TABLET | Refills: 1 | Status: SHIPPED | OUTPATIENT
Start: 2021-11-16 | End: 2022-03-01 | Stop reason: SDUPTHER

## 2021-11-16 NOTE — TELEPHONE ENCOUNTER
LOV 5/3/21  LRF 12/20/20 Citalopram, All others 5/11/21  RTO Scheduled    Health Maintenance   Topic Date Due    Breast cancer screen  02/05/2021    Diabetic microalbuminuria test  08/12/2021    Lipid screen  08/12/2021    COVID-19 Vaccine (3 - Booster for Moderna series) 10/22/2021    Diabetic retinal exam  12/01/2021 (Originally 5/6/2020)    Cervical cancer screen  12/01/2021 (Originally 10/26/2019)    Diabetic foot exam  05/03/2022 (Originally 10/4/2019)    Flu vaccine (1) 09/29/2022 (Originally 9/1/2021)    A1C test (Diabetic or Prediabetic)  01/21/2022    Potassium monitoring  01/21/2022    Creatinine monitoring  01/21/2022    DTaP/Tdap/Td vaccine (2 - Td or Tdap) 02/27/2023    Pneumococcal 0-64 years Vaccine (2 of 2 - PPSV23) 02/25/2024    Colon cancer screen fecal DNA test (Cologuard)  08/19/2024    Shingles Vaccine  Completed    Hepatitis C screen  Completed    HIV screen  Completed    Hepatitis A vaccine  Aged Out    Hib vaccine  Aged Out    Meningococcal (ACWY) vaccine  Aged Out             (applicable per patient's age: Cancer Screenings, Depression Screening, Fall Risk Screening, Immunizations)    Hemoglobin A1C (%)   Date Value   01/21/2021 6.1   08/12/2020 6.3   02/21/2019 5.7     Microalb/Crt.  Ratio (mcg/mg creat)   Date Value   10/04/2018 26 (H)     LDL Cholesterol (mg/dL)   Date Value   10/02/2018 115     LDL Calculated (mg/dL)   Date Value   08/12/2020 60     AST (U/L)   Date Value   08/12/2020 39     ALT (U/L)   Date Value   08/12/2020 44     BUN (mg/dL)   Date Value   01/21/2021 10      (goal A1C is < 7)   (goal LDL is <100) need 30-50% reduction from baseline     BP Readings from Last 3 Encounters:   10/11/21 124/84   09/29/21 132/80   05/03/21 104/60    (goal /80)      All Future Testing planned in [facility]:  Lab Frequency Next Occurrence   Urinalysis Reflex to Culture Once 11/20/2021   XR ABDOMEN (KUB) (SINGLE AP VIEW) Once 12/01/2021   DANII DIGITAL SCREEN W OR WO CAD BILATERAL Once 07/03/2022   CBC Once 11/25/2021   Comprehensive Metabolic Panel, Fasting Once 11/25/2021   Lipid Panel Once 11/25/2021   TSH without Reflex Once 11/25/2021   Vitamin D 25 Hydroxy Once 11/25/2021   Microalbumin, Ur Once 11/25/2021   Hemoglobin A1C Once 11/25/2021   CBC Auto Differential Once 11/25/2021   Iron and TIBC Once 11/25/2021       Next Visit Date:  Future Appointments   Date Time Provider Henry Spencer   11/29/2021  9:30 AM MIGDALIA Steen - [facility]            Patient Active Problem List:     Dysmetabolic syndrome X     Essential hypertension     Hyperlipidemia     Atypical lobular hyperplasia of left breast     Nephrolithiasis     Major depressive disorder, single episode, mild (HCC)     Microalbuminuria     Type 2 diabetes mellitus without complication, without long-term current use of insulin ([facility] [address]

## 2021-12-09 ENCOUNTER — OFFICE VISIT (OUTPATIENT)
Dept: FAMILY MEDICINE CLINIC | Age: 66
End: 2021-12-09
Payer: COMMERCIAL

## 2021-12-09 VITALS
DIASTOLIC BLOOD PRESSURE: 84 MMHG | WEIGHT: 193 LBS | BODY MASS INDEX: 36.47 KG/M2 | TEMPERATURE: 97.8 F | SYSTOLIC BLOOD PRESSURE: 124 MMHG | OXYGEN SATURATION: 98 % | HEART RATE: 73 BPM

## 2021-12-09 DIAGNOSIS — R09.81 NASAL CONGESTION: ICD-10-CM

## 2021-12-09 DIAGNOSIS — E11.9 TYPE 2 DIABETES MELLITUS WITHOUT COMPLICATION, WITHOUT LONG-TERM CURRENT USE OF INSULIN (HCC): ICD-10-CM

## 2021-12-09 DIAGNOSIS — Z01.419 ENCOUNTER FOR GYNECOLOGICAL EXAMINATION WITHOUT ABNORMAL FINDING: Primary | ICD-10-CM

## 2021-12-09 DIAGNOSIS — R35.0 URINARY FREQUENCY: ICD-10-CM

## 2021-12-09 DIAGNOSIS — Z23 NEED FOR INFLUENZA VACCINATION: ICD-10-CM

## 2021-12-09 LAB
BILIRUBIN, POC: NORMAL
BLOOD URINE, POC: NORMAL
CLARITY, POC: CLEAR
COLOR, POC: YELLOW
GLUCOSE URINE, POC: NORMAL
KETONES, POC: NORMAL
LEUKOCYTE EST, POC: NORMAL
NITRITE, POC: NORMAL
PH, POC: 7
PROTEIN, POC: NORMAL
SPECIFIC GRAVITY, POC: 1.02
UROBILINOGEN, POC: 0.2

## 2021-12-09 PROCEDURE — 99396 PREV VISIT EST AGE 40-64: CPT | Performed by: NURSE PRACTITIONER

## 2021-12-09 PROCEDURE — 90674 CCIIV4 VAC NO PRSV 0.5 ML IM: CPT | Performed by: NURSE PRACTITIONER

## 2021-12-09 PROCEDURE — 90471 IMMUNIZATION ADMIN: CPT | Performed by: NURSE PRACTITIONER

## 2021-12-09 ASSESSMENT — ENCOUNTER SYMPTOMS
COUGH: 0
SHORTNESS OF BREATH: 0
CONSTIPATION: 0
DIARRHEA: 0
SORE THROAT: 0
RHINORRHEA: 0

## 2021-12-09 NOTE — PATIENT INSTRUCTIONS
PLEASE NOTE THAT ANY DISCONTINUATION OF MEDICATIONS OR MEDICAL SUPPLIES REFLECTED IN TODAY'S VISIT SUMMARY  MAY NOT HAVE COMPLETED AS A CHANGE IN YOUR PLAN OF CARE. THESE CHANGES MAY HAVE ONLY BEEN DONE SO IN ORDER TO CLEAN UP LIST FROM DUPLICATIONS OR MISCELLANEOUS SUPPLIES ONLY NEEDED PERIODIC REORDERS. DO NOT DISCONTINUE MEDICATIONS LISTED UNLESS SPECIFICALLY DISCUSSED IN YOUR APPOINTMENT WITH PROVIDER OR SPECIALIST, IF YOU HAVE AN QUESTIONS, PLEASE CONTACT YOUR PROVIDER FOR CLARIFICATION IF NOT ADDRESSED IN YOUR PLAN OF CARE. It was my pleasure to meet with you today. Please contact me with any questions or concerns, and please notify myself or our manager if there is anyway we can improve our service in your health care needs. Below I have listed some instructions and information that pertain to today's visit.    -You have been advised to continue all current medication, otherwise not discussed in today's visit  -New medications and refills will been sent and made available at pharmacy or mail away  -Heathy daily diet to include healthy balanced diet with good portions of lean meats and vegetables  -Drink 6-8 glasses of water daily     -humidifier in the room  -kunal med rinses to help clear out sinuses  -spray gel to help keep sinuses from drying out through the day         NeilMed NasoGel for Dry Noses    Sodium HyaluronateAloe Vera NasoGel provides moisture to hydrate and lubricate dry and irritated nasal passages caused by dry climate and indoor heat; It helps reduce nasal dryness experienced during air travel, oxygen and CPAP use, as well as dryness symptoms caused by atrophic rhinitis, post radiation therapy and sinus surgery. Suggested Use Apply a small amount of NasoGEL into each nostril every 4 to 6 hours.

## 2021-12-09 NOTE — PROGRESS NOTES
[address] [facility]   [address]   [phone]    2021     CHIEF COMPLAINT:     Edi Pagan (:  1955) is a 61 y.o. female, here for evaluation of the following chief complaint(s):  Gynecologic Exam      REVIEWED INFORMATION      Allergies   Allergen Reactions    Sulfa Antibiotics Hives and Swelling       Current Outpatient Medications   Medication Sig Dispense Refill    metFORMIN (GLUCOPHAGE-XR) 500 MG extended release tablet Take 2 tablets by mouth 2 times daily (with meals) 360 tablet 1    losartan (COZAAR) 100 MG tablet Take 1 tablet by mouth daily 90 tablet 1    citalopram (CELEXA) 40 MG tablet Take 1 tablet by mouth daily 90 tablet 1    simvastatin (ZOCOR) 40 MG tablet Take 1 tablet by mouth nightly 90 tablet 1    hydroCHLOROthiazide (HYDRODIURIL) 25 MG tablet TAKE 1 TABLET DAILY 90 tablet 1    aspirin (JOVANA ASPIRIN) 325 MG tablet Take 1 tablet by mouth daily 30 tablet 3    fluticasone (FLONASE) 50 MCG/ACT nasal spray 1 spray by Each Nostril route daily 2 Bottle 1    glucose monitoring kit (FREESTYLE) monitoring kit 1 kit by Does not apply route daily 1 kit 0    Lancets MISC Check blood sugars once daily 100 each 3    blood glucose monitor strips Check blood sugars once daily 100 strip 3    fluticasone (FLONASE) 50 MCG/ACT nasal spray 2 sprays by Each Nostril route daily (Patient not taking: Reported on 2021) 48 g 1    Ferrous Sulfate Dried ER (SLOW RELEASE IRON) 45 MG TBCR Take 1 tablet by mouth daily 90 tablet 1     No current facility-administered medications for this visit.         Patient Care Team:  MIGDALIA Adames CNP as PCP - General (Nurse Practitioner)  MIGDALIA Adames CNP as PCP - [facility]  Hortencia Carrillo MD as Consulting Physician (Endocrinology)  Shawna Sapp MD as Consulting Physician (Podiatry)    REVIEW OF SYSTEMS:     Review of Systems   Constitutional: Negative for activity change, fatigue and unexpected weight change. HENT: Positive for congestion. Negative for ear pain, hearing loss, rhinorrhea and sore throat. Respiratory: Negative for cough and shortness of breath. Cardiovascular: Negative for chest pain, palpitations and leg swelling. Gastrointestinal: Negative for constipation and diarrhea. Genitourinary: Positive for frequency. Musculoskeletal: Negative for arthralgias and gait problem. Neurological: Negative for dizziness, weakness and headaches. Psychiatric/Behavioral: Negative for confusion. The patient is not nervous/anxious. HISTORY OF PRESENT ILLNESS     Gynecologic Exam  This is a new problem. The problem occurs daily. The problem has been unchanged. The patient is experiencing no pain. She is not pregnant. Associated symptoms include frequency. Pertinent negatives include no constipation, diarrhea, headaches or sore throat. She uses nothing for contraception. She is postmenopausal.     Labs reviewed    Patient also has complaints in regards continued sinus congestion - she denies fever, chills, nausea vomiting. PHYSICAL EXAM:     /84   Pulse 73   Temp 97.8 °F (36.6 °C) (Tympanic)   Wt 193 lb (87.5 kg)   LMP 06/21/2006   SpO2 98%   BMI 36.47 kg/m²      Physical Exam  Vitals reviewed. Constitutional:       Appearance: Normal appearance. She is not ill-appearing. Cardiovascular:      Rate and Rhythm: Normal rate and regular rhythm. Heart sounds: No murmur heard. No friction rub. No gallop. Pulmonary:      Effort: Pulmonary effort is normal. No respiratory distress. Breath sounds: No wheezing. Abdominal:      General: Bowel sounds are normal.      Palpations: Abdomen is soft. Tenderness: There is no abdominal tenderness. Genitourinary:     Exam position: Lithotomy position. Musculoskeletal:      Right lower leg: No edema. Left lower leg: No edema. Skin:     General: Skin is warm.       Findings: No erythema, lesion or rash. Neurological:      Mental Status: She is alert. Psychiatric:         Mood and Affect: Mood normal.         Behavior: Behavior is cooperative. PROCEDURE/ IN OFFICE TESTING/ LAB REVIEW     No in office testing or procedures completed during today's office visit. Blood sugar specifically your A1C is stable with only a small incremental change at this time. Kidney function is is stable  Liver function is is stable    Blood counts are stable with no indication of anemia or low platelets. Lipid Panel is good - LDL - \"bad cholesterol\" numbers are good. HDL \"good cholesterol\" is good. Triglyceride numbers are good. continue with healthy diet of lean means, low carbohydrates, and low sugars. As well as, continue any current supplements or medication you are currently taking. Vitamin D level is normal. Vitamin D is not only important for bone jay, but studies now show the at is directly impacts cardiovascular, musculoskeletal, and brain health. A low vitamin D can also increase fatigue, depression, and muscle aches and pains. I would continuing with any supplementation you are currently taking and/or advise increasing foods high in vitamin D such as: fortified cereals, fortified milk, oily fishes (ex: salmon, herring, cod, or mackerel), red meat, liver, egg yolks. TSH is stable value. Microalbumin is elevated over previous year. Your blood creatinine was normal, but you are definitely spilling more protein in urine, this is primarily due to diabetes. At this point, stay hydrated, and if worsens will send to nephrology for evaluation. No changes to plan of care at this time unless addressed in this communication. Continue with any medications you are currently taking, with no change to dosages.    I always encourage healthy life style practice including diet low in carbohydrates, hidden sugars, good amount of physical activity, stay hydrated, stress relieve strategies such as meditation and deep breathing, and get a good night sleep. ASSESSMENT/PLAN/ FOLLOWUP:     PLEASE NOTE THAT ANY DISCONTINUATION OF MEDICATIONS OR MEDICAL SUPPLIES REFLECTED IN TODAY'S VISIT SUMMARY  MAY NOT HAVE COMPLETED AS A CHANGE IN YOUR PLAN OF CARE. THESE CHANGES MAY HAVE ONLY BEEN DONE SO IN ORDER TO CLEAN UP LIST FROM DUPLICATIONS OR MISCELLANEOUS SUPPLIES ONLY NEEDED PERIODIC REORDERS. DO NOT DISCONTINUE MEDICATIONS LISTED UNLESS SPECIFICALLY DISCUSSED IN YOUR APPOINTMENT WITH PROVIDER OR SPECIALIST, IF YOU HAVE AN QUESTIONS, PLEASE CONTACT YOUR PROVIDER FOR CLARIFICATION IF NOT ADDRESSED IN YOUR PLAN OF CARE. 1. Encounter for gynecological examination without abnormal finding  -     PAP Smear; Future  2. Urinary frequency  -     POCT Urinalysis no Micro  -     Urinalysis Reflex to Culture; Future  3. Type 2 diabetes mellitus without complication, without long-term current use of insulin (HCC)  -     Hemoglobin A1C; Future  -     Basic Metabolic Panel; Future  4. Nasal congestion  kunal med spray gel recommended  Humidified air  Ana Rosa pot or rinse to clear out excess mucous to prevent congestion. Return in about 6 months (around 6/9/2022). COMMUNICATION:             The best way to find yourself is to lose yourself in the service of others - [address] [place] [address]. [address] [facility]   [email or web address]  Office: [phone]     An electronic signature was used to authenticate this note.   Signed by MIGDALIA Magaña CNP, APRN-CNP on 12/9/2021 at 9:20 AM

## 2021-12-09 NOTE — PROGRESS NOTES
Vaccine Information Sheet, \"Influenza - Inactivated\"  given to Gregorio Patricia, or parent/legal guardian of  Gregorio Patricia and verbalized understanding. Patient responses:    Have you ever had a reaction to a flu vaccine? No  Do you have any current illness? No  Have you ever had [place] Syndrome? No  Do you have a serious allergy to any of the following: Neomycin, Polymyxin, Thimerosal, eggs or egg products? No    Flu vaccine given per order. Please see immunization tab. Risks and benefits explained. Current VIS given.

## 2021-12-15 DIAGNOSIS — E78.5 HYPERLIPIDEMIA, UNSPECIFIED HYPERLIPIDEMIA TYPE: ICD-10-CM

## 2021-12-15 DIAGNOSIS — E55.9 VITAMIN D DEFICIENCY: ICD-10-CM

## 2021-12-15 DIAGNOSIS — E04.9 THYROID ENLARGED: ICD-10-CM

## 2021-12-15 DIAGNOSIS — E11.9 TYPE 2 DIABETES MELLITUS WITHOUT COMPLICATION, WITHOUT LONG-TERM CURRENT USE OF INSULIN (HCC): ICD-10-CM

## 2021-12-15 DIAGNOSIS — E88.81 DYSMETABOLIC SYNDROME X: ICD-10-CM

## 2021-12-15 DIAGNOSIS — R79.0 ABNORMAL IRON SATURATION: ICD-10-CM

## 2021-12-15 DIAGNOSIS — Z12.31 ENCOUNTER FOR SCREENING MAMMOGRAM FOR MALIGNANT NEOPLASM OF BREAST: ICD-10-CM

## 2021-12-15 DIAGNOSIS — I10 ESSENTIAL HYPERTENSION: ICD-10-CM

## 2022-02-24 DIAGNOSIS — E78.5 HYPERLIPIDEMIA, UNSPECIFIED HYPERLIPIDEMIA TYPE: ICD-10-CM

## 2022-02-24 DIAGNOSIS — I10 ESSENTIAL HYPERTENSION: ICD-10-CM

## 2022-02-24 DIAGNOSIS — E88.81 DYSMETABOLIC SYNDROME X: ICD-10-CM

## 2022-02-24 RX ORDER — SIMVASTATIN 40 MG
40 TABLET ORAL NIGHTLY
Qty: 90 TABLET | Refills: 1 | Status: SHIPPED | OUTPATIENT
Start: 2022-02-24 | End: 2022-03-01 | Stop reason: SDUPTHER

## 2022-02-24 RX ORDER — METFORMIN HYDROCHLORIDE 500 MG/1
1000 TABLET, EXTENDED RELEASE ORAL 2 TIMES DAILY WITH MEALS
Qty: 360 TABLET | Refills: 1 | Status: SHIPPED | OUTPATIENT
Start: 2022-02-24 | End: 2022-03-01 | Stop reason: SDUPTHER

## 2022-02-24 RX ORDER — LOSARTAN POTASSIUM 100 MG/1
100 TABLET ORAL DAILY
Qty: 90 TABLET | Refills: 1 | Status: SHIPPED | OUTPATIENT
Start: 2022-02-24 | End: 2022-03-01 | Stop reason: SDUPTHER

## 2022-02-24 NOTE — TELEPHONE ENCOUNTER
LOV 12/9/21  LRF 11/16/21    Health Maintenance   Topic Date Due    Depression Monitoring  Never done    Diabetic retinal exam  05/06/2020    Diabetic foot exam  05/03/2022 (Originally 10/4/2019)    A1C test (Diabetic or Prediabetic)  11/12/2022    Diabetic microalbuminuria test  11/12/2022    Lipid screen  11/12/2022    Potassium monitoring  11/12/2022    Creatinine monitoring  11/12/2022    DTaP/Tdap/Td vaccine (2 - Td or Tdap) 02/27/2023    Breast cancer screen  11/15/2023    Pneumococcal 65+ years Vaccine (1 of 1 - PPSV23) 02/25/2024    Colorectal Cancer Screen  08/19/2024    Cervical cancer screen  12/09/2024    DEXA (modify frequency per FRAX score)  Completed    Flu vaccine  Completed    Shingles Vaccine  Completed    COVID-19 Vaccine  Completed    Hepatitis C screen  Completed    HIV screen  Completed    Hepatitis A vaccine  Aged Out    Hib vaccine  Aged Out    Meningococcal (ACWY) vaccine  Aged Out             (applicable per patient's age: Cancer Screenings, Depression Screening, Fall Risk Screening, Immunizations)    Hemoglobin A1C (%)   Date Value   11/12/2021 6.2   01/21/2021 6.1   08/12/2020 6.3     Microalb/Crt.  Ratio (mcg/mg creat)   Date Value   10/04/2018 26 (H)     LDL Cholesterol (mg/dL)   Date Value   10/02/2018 115     LDL Calculated (mg/dL)   Date Value   11/12/2021 70     AST (U/L)   Date Value   11/12/2021 22     ALT (U/L)   Date Value   11/12/2021 21     BUN (mg/dL)   Date Value   11/12/2021 14      (goal A1C is < 7)   (goal LDL is <100) need 30-50% reduction from baseline     BP Readings from Last 3 Encounters:   12/09/21 124/84   10/11/21 124/84   09/29/21 132/80    (goal /80)      All Future Testing planned in [facility]:  Lab Frequency Next Occurrence   Hemoglobin A1C Once 06/09/2022       Next Visit Date:  Future Appointments   Date Time Provider Henry Spencer   6/8/2022  8:00 AM MIGDALIA Nova - [facility]            Patient Active Problem List:     Dysmetabolic syndrome X     Essential hypertension     Hyperlipidemia     Atypical lobular hyperplasia of left breast     Nephrolithiasis     Major depressive disorder, single episode, mild (HCC)     Microalbuminuria     Type 2 diabetes mellitus without complication, without long-term current use of insulin ([facility]

## 2022-03-01 DIAGNOSIS — F32.0 MAJOR DEPRESSIVE DISORDER, SINGLE EPISODE, MILD (HCC): ICD-10-CM

## 2022-03-01 DIAGNOSIS — E88.81 DYSMETABOLIC SYNDROME X: ICD-10-CM

## 2022-03-01 DIAGNOSIS — I10 ESSENTIAL HYPERTENSION: ICD-10-CM

## 2022-03-01 DIAGNOSIS — F41.1 GENERALIZED ANXIETY DISORDER: ICD-10-CM

## 2022-03-01 DIAGNOSIS — E78.5 HYPERLIPIDEMIA, UNSPECIFIED HYPERLIPIDEMIA TYPE: ICD-10-CM

## 2022-03-01 RX ORDER — SIMVASTATIN 40 MG
40 TABLET ORAL NIGHTLY
Qty: 90 TABLET | Refills: 1 | Status: SHIPPED | OUTPATIENT
Start: 2022-03-01 | End: 2022-09-01 | Stop reason: SDUPTHER

## 2022-03-01 RX ORDER — METFORMIN HYDROCHLORIDE 500 MG/1
1000 TABLET, EXTENDED RELEASE ORAL 2 TIMES DAILY WITH MEALS
Qty: 360 TABLET | Refills: 1 | Status: SHIPPED | OUTPATIENT
Start: 2022-03-01 | End: 2022-10-20 | Stop reason: SDUPTHER

## 2022-03-01 RX ORDER — HYDROCHLOROTHIAZIDE 25 MG/1
25 TABLET ORAL DAILY
Qty: 90 TABLET | Refills: 1 | Status: SHIPPED | OUTPATIENT
Start: 2022-03-01 | End: 2022-09-01 | Stop reason: SDUPTHER

## 2022-03-01 RX ORDER — LOSARTAN POTASSIUM 100 MG/1
100 TABLET ORAL DAILY
Qty: 90 TABLET | Refills: 1 | Status: SHIPPED | OUTPATIENT
Start: 2022-03-01 | End: 2022-09-01 | Stop reason: SDUPTHER

## 2022-03-01 RX ORDER — CITALOPRAM 40 MG/1
40 TABLET ORAL DAILY
Qty: 90 TABLET | Refills: 1 | Status: SHIPPED | OUTPATIENT
Start: 2022-03-01 | End: 2022-09-01 | Stop reason: SDUPTHER

## 2022-04-25 ENCOUNTER — TELEPHONE (OUTPATIENT)
Dept: FAMILY MEDICINE CLINIC | Age: 67
End: 2022-04-25

## 2022-04-25 NOTE — TELEPHONE ENCOUNTER
Please advise. Pt does have a BMP and Hemoglobin a1c in chart as active lab order. Did you want to add anything else?

## 2022-04-25 NOTE — TELEPHONE ENCOUNTER
----- Message from Romayne Cord sent at 4/20/2022  1:52 PM EDT -----  Subject: Referral Request    QUESTIONS   Reason for referral request? [facility] received call from PT Tylor Basurto who is   requesting lab work orders for upcoming diabetes follow up appt on 6/21/22   at 9:30AM. Please return call to PT   Has the physician seen you for this condition before? Yes  Select a date? 2021-12-09  Select the Provider the patient wants to be referred to, if known (PCP or   Specialist)? Marjan Red   Preferred Specialist (if applicable)? Do you already have an appointment scheduled? Yes  Select Scheduled Date? 2022-06-21  Select Scheduled Physician? Marjan Red   Additional Information for Provider?   ---------------------------------------------------------------------------  --------------  [address]  What is the best way for the office to contact you? OK to leave message on   voicemail  Preferred Call Back Phone Number? [phone]  ---------------------------------------------------------------------------  --------------  SCRIPT ANSWERS  Relationship to Patient?  Self

## 2022-07-02 DIAGNOSIS — F32.0 MAJOR DEPRESSIVE DISORDER, SINGLE EPISODE, MILD (HCC): ICD-10-CM

## 2022-07-02 DIAGNOSIS — I10 ESSENTIAL HYPERTENSION: ICD-10-CM

## 2022-07-02 DIAGNOSIS — F41.1 GENERALIZED ANXIETY DISORDER: ICD-10-CM

## 2022-07-02 DIAGNOSIS — E88.81 DYSMETABOLIC SYNDROME X: ICD-10-CM

## 2022-07-02 DIAGNOSIS — E78.5 HYPERLIPIDEMIA, UNSPECIFIED HYPERLIPIDEMIA TYPE: ICD-10-CM

## 2022-07-05 RX ORDER — HYDROCHLOROTHIAZIDE 25 MG/1
25 TABLET ORAL DAILY
Qty: 90 TABLET | Refills: 3 | OUTPATIENT
Start: 2022-07-05 | End: 2023-07-05

## 2022-07-05 RX ORDER — METFORMIN HYDROCHLORIDE 500 MG/1
TABLET, EXTENDED RELEASE ORAL
Qty: 360 TABLET | Refills: 3 | OUTPATIENT
Start: 2022-07-05

## 2022-07-05 RX ORDER — CITALOPRAM 40 MG/1
40 TABLET ORAL DAILY
Qty: 90 TABLET | Refills: 3 | OUTPATIENT
Start: 2022-07-05 | End: 2023-07-05

## 2022-07-05 RX ORDER — LOSARTAN POTASSIUM 100 MG/1
100 TABLET ORAL DAILY
Qty: 90 TABLET | Refills: 3 | OUTPATIENT
Start: 2022-07-05 | End: 2023-07-05

## 2022-07-05 RX ORDER — SIMVASTATIN 40 MG
TABLET ORAL
Qty: 90 TABLET | Refills: 3 | OUTPATIENT
Start: 2022-07-05

## 2022-09-01 ENCOUNTER — OFFICE VISIT (OUTPATIENT)
Dept: FAMILY MEDICINE CLINIC | Age: 67
End: 2022-09-01
Payer: COMMERCIAL

## 2022-09-01 ENCOUNTER — HOSPITAL ENCOUNTER (OUTPATIENT)
Age: 67
Setting detail: SPECIMEN
Discharge: HOME | End: 2022-09-01

## 2022-09-01 VITALS
DIASTOLIC BLOOD PRESSURE: 88 MMHG | BODY MASS INDEX: 35.57 KG/M2 | HEART RATE: 67 BPM | TEMPERATURE: 98.4 F | SYSTOLIC BLOOD PRESSURE: 130 MMHG | WEIGHT: 188.4 LBS | OXYGEN SATURATION: 98 % | HEIGHT: 61 IN

## 2022-09-01 DIAGNOSIS — R07.9 INTERMITTENT CHEST PAIN: ICD-10-CM

## 2022-09-01 DIAGNOSIS — R10.9 RIGHT FLANK PAIN: ICD-10-CM

## 2022-09-01 DIAGNOSIS — E11.9 TYPE 2 DIABETES MELLITUS WITHOUT COMPLICATION, WITHOUT LONG-TERM CURRENT USE OF INSULIN (HCC): Primary | ICD-10-CM

## 2022-09-01 DIAGNOSIS — E11.9 TYPE 2 DIABETES MELLITUS WITHOUT COMPLICATION, WITHOUT LONG-TERM CURRENT USE OF INSULIN (HCC): ICD-10-CM

## 2022-09-01 DIAGNOSIS — F41.1 GENERALIZED ANXIETY DISORDER: ICD-10-CM

## 2022-09-01 DIAGNOSIS — E78.5 HYPERLIPIDEMIA, UNSPECIFIED HYPERLIPIDEMIA TYPE: ICD-10-CM

## 2022-09-01 DIAGNOSIS — Z23 NEED FOR PNEUMOCOCCAL VACCINATION: ICD-10-CM

## 2022-09-01 DIAGNOSIS — F32.0 MAJOR DEPRESSIVE DISORDER, SINGLE EPISODE, MILD (HCC): ICD-10-CM

## 2022-09-01 DIAGNOSIS — R07.9 CHEST PAIN AT REST: ICD-10-CM

## 2022-09-01 DIAGNOSIS — I10 ESSENTIAL HYPERTENSION: ICD-10-CM

## 2022-09-01 LAB
BILIRUBIN, POC: NEGATIVE
BLOOD URINE, POC: NEGATIVE
CLARITY, POC: CLEAR
COLOR, POC: YELLOW
GLUCOSE URINE, POC: NEGATIVE
HBA1C MFR BLD: 5.6 %
HCT VFR BLD CALC: 43.1 % (ref 36.3–47.1)
HEMOGLOBIN: 13.2 G/DL (ref 11.9–15.1)
KETONES, POC: NEGATIVE
LEUKOCYTE EST, POC: ABNORMAL
MCH RBC QN AUTO: 30.8 PG (ref 25.2–33.5)
MCHC RBC AUTO-ENTMCNC: 30.6 G/DL (ref 28.4–34.8)
MCV RBC AUTO: 100.5 FL (ref 82.6–102.9)
NITRITE, POC: NEGATIVE
NRBC AUTOMATED: 0 PER 100 WBC
PDW BLD-RTO: 13.7 % (ref 11.8–14.4)
PH, POC: 7.5
PLATELET # BLD: 208 K/UL (ref 138–453)
PMV BLD AUTO: 10.1 FL (ref 8.1–13.5)
PROTEIN, POC: NEGATIVE
RBC # BLD: 4.29 M/UL (ref 3.95–5.11)
SPECIFIC GRAVITY, POC: 1.02
UROBILINOGEN, POC: 0.2
WBC # BLD: 5.2 K/UL (ref 3.5–11.3)

## 2022-09-01 PROCEDURE — 90471 IMMUNIZATION ADMIN: CPT | Performed by: NURSE PRACTITIONER

## 2022-09-01 PROCEDURE — 99215 OFFICE O/P EST HI 40 MIN: CPT | Performed by: NURSE PRACTITIONER

## 2022-09-01 PROCEDURE — 83036 HEMOGLOBIN GLYCOSYLATED A1C: CPT | Performed by: NURSE PRACTITIONER

## 2022-09-01 PROCEDURE — 81002 URINALYSIS NONAUTO W/O SCOPE: CPT | Performed by: NURSE PRACTITIONER

## 2022-09-01 PROCEDURE — 93000 ELECTROCARDIOGRAM COMPLETE: CPT | Performed by: NURSE PRACTITIONER

## 2022-09-01 PROCEDURE — 2022F DILAT RTA XM EVC RTNOPTHY: CPT | Performed by: NURSE PRACTITIONER

## 2022-09-01 PROCEDURE — 1123F ACP DISCUSS/DSCN MKR DOCD: CPT | Performed by: NURSE PRACTITIONER

## 2022-09-01 PROCEDURE — 3044F HG A1C LEVEL LT 7.0%: CPT | Performed by: NURSE PRACTITIONER

## 2022-09-01 PROCEDURE — G8417 CALC BMI ABV UP PARAM F/U: HCPCS | Performed by: NURSE PRACTITIONER

## 2022-09-01 PROCEDURE — 1090F PRES/ABSN URINE INCON ASSESS: CPT | Performed by: NURSE PRACTITIONER

## 2022-09-01 PROCEDURE — G8427 DOCREV CUR MEDS BY ELIG CLIN: HCPCS | Performed by: NURSE PRACTITIONER

## 2022-09-01 PROCEDURE — 90677 PCV20 VACCINE IM: CPT | Performed by: NURSE PRACTITIONER

## 2022-09-01 PROCEDURE — 1036F TOBACCO NON-USER: CPT | Performed by: NURSE PRACTITIONER

## 2022-09-01 PROCEDURE — 3017F COLORECTAL CA SCREEN DOC REV: CPT | Performed by: NURSE PRACTITIONER

## 2022-09-01 PROCEDURE — G8399 PT W/DXA RESULTS DOCUMENT: HCPCS | Performed by: NURSE PRACTITIONER

## 2022-09-01 RX ORDER — HYDROXYZINE HYDROCHLORIDE 25 MG/1
25 TABLET, FILM COATED ORAL EVERY 8 HOURS PRN
Qty: 45 TABLET | Refills: 0 | Status: SHIPPED | OUTPATIENT
Start: 2022-09-01 | End: 2022-09-15

## 2022-09-01 RX ORDER — HYDROCHLOROTHIAZIDE 25 MG/1
25 TABLET ORAL DAILY
Qty: 90 TABLET | Refills: 1 | Status: SHIPPED | OUTPATIENT
Start: 2022-09-01 | End: 2023-09-01

## 2022-09-01 RX ORDER — LOSARTAN POTASSIUM 100 MG/1
100 TABLET ORAL DAILY
Qty: 90 TABLET | Refills: 1 | Status: SHIPPED | OUTPATIENT
Start: 2022-09-01 | End: 2023-09-01

## 2022-09-01 RX ORDER — SIMVASTATIN 40 MG
40 TABLET ORAL NIGHTLY
Qty: 90 TABLET | Refills: 1 | Status: SHIPPED | OUTPATIENT
Start: 2022-09-01 | End: 2023-09-01

## 2022-09-01 RX ORDER — CITALOPRAM 40 MG/1
40 TABLET ORAL DAILY
Qty: 90 TABLET | Refills: 1 | Status: SHIPPED | OUTPATIENT
Start: 2022-09-01 | End: 2023-09-01

## 2022-09-01 SDOH — ECONOMIC STABILITY: FOOD INSECURITY: WITHIN THE PAST 12 MONTHS, THE FOOD YOU BOUGHT JUST DIDN'T LAST AND YOU DIDN'T HAVE MONEY TO GET MORE.: NEVER TRUE

## 2022-09-01 SDOH — ECONOMIC STABILITY: FOOD INSECURITY: WITHIN THE PAST 12 MONTHS, YOU WORRIED THAT YOUR FOOD WOULD RUN OUT BEFORE YOU GOT MONEY TO BUY MORE.: NEVER TRUE

## 2022-09-01 ASSESSMENT — PATIENT HEALTH QUESTIONNAIRE - PHQ9
4. FEELING TIRED OR HAVING LITTLE ENERGY: 3
2. FEELING DOWN, DEPRESSED OR HOPELESS: 2
6. FEELING BAD ABOUT YOURSELF - OR THAT YOU ARE A FAILURE OR HAVE LET YOURSELF OR YOUR FAMILY DOWN: 0
8. MOVING OR SPEAKING SO SLOWLY THAT OTHER PEOPLE COULD HAVE NOTICED. OR THE OPPOSITE, BEING SO FIGETY OR RESTLESS THAT YOU HAVE BEEN MOVING AROUND A LOT MORE THAN USUAL: 0
SUM OF ALL RESPONSES TO PHQ QUESTIONS 1-9: 11
1. LITTLE INTEREST OR PLEASURE IN DOING THINGS: 0
3. TROUBLE FALLING OR STAYING ASLEEP: 3
9. THOUGHTS THAT YOU WOULD BE BETTER OFF DEAD, OR OF HURTING YOURSELF: 0
SUM OF ALL RESPONSES TO PHQ9 QUESTIONS 1 & 2: 2
SUM OF ALL RESPONSES TO PHQ QUESTIONS 1-9: 11
7. TROUBLE CONCENTRATING ON THINGS, SUCH AS READING THE NEWSPAPER OR WATCHING TELEVISION: 0
SUM OF ALL RESPONSES TO PHQ QUESTIONS 1-9: 11
SUM OF ALL RESPONSES TO PHQ QUESTIONS 1-9: 11
5. POOR APPETITE OR OVEREATING: 3
10. IF YOU CHECKED OFF ANY PROBLEMS, HOW DIFFICULT HAVE THESE PROBLEMS MADE IT FOR YOU TO DO YOUR WORK, TAKE CARE OF THINGS AT HOME, OR GET ALONG WITH OTHER PEOPLE: 0

## 2022-09-01 ASSESSMENT — ENCOUNTER SYMPTOMS
COUGH: 1
RHINORRHEA: 0
SHORTNESS OF BREATH: 0
DIARRHEA: 1
SORE THROAT: 0
CONSTIPATION: 0

## 2022-09-01 ASSESSMENT — ANXIETY QUESTIONNAIRES
6. BECOMING EASILY ANNOYED OR IRRITABLE: 3
1. FEELING NERVOUS, ANXIOUS, OR ON EDGE: 2
IF YOU CHECKED OFF ANY PROBLEMS ON THIS QUESTIONNAIRE, HOW DIFFICULT HAVE THESE PROBLEMS MADE IT FOR YOU TO DO YOUR WORK, TAKE CARE OF THINGS AT HOME, OR GET ALONG WITH OTHER PEOPLE: SOMEWHAT DIFFICULT
5. BEING SO RESTLESS THAT IT IS HARD TO SIT STILL: 0
2. NOT BEING ABLE TO STOP OR CONTROL WORRYING: 3
7. FEELING AFRAID AS IF SOMETHING AWFUL MIGHT HAPPEN: 3
GAD7 TOTAL SCORE: 16
3. WORRYING TOO MUCH ABOUT DIFFERENT THINGS: 3
4. TROUBLE RELAXING: 2

## 2022-09-01 ASSESSMENT — SOCIAL DETERMINANTS OF HEALTH (SDOH): HOW HARD IS IT FOR YOU TO PAY FOR THE VERY BASICS LIKE FOOD, HOUSING, MEDICAL CARE, AND HEATING?: NOT HARD AT ALL

## 2022-09-01 NOTE — PROGRESS NOTES
[address] [facility]   [address]   [phone]    2022     CHIEF COMPLAINT:     Bernadette Ralph (:  1955) is a 74 y.o. female, here for evaluation of the following chief complaint(s):  Diabetes (8 month f/u) and Hypertension (8 month f/u)      REVIEWED INFORMATION      Allergies   Allergen Reactions    Sulfa Antibiotics Hives and Swelling       Current Outpatient Medications   Medication Sig Dispense Refill    losartan (COZAAR) 100 MG tablet Take 1 tablet by mouth daily 90 tablet 1    simvastatin (ZOCOR) 40 MG tablet Take 1 tablet by mouth nightly 90 tablet 1    citalopram (CELEXA) 40 MG tablet Take 1 tablet by mouth daily 90 tablet 1    hydroCHLOROthiazide (HYDRODIURIL) 25 MG tablet Take 1 tablet by mouth daily 90 tablet 1    hydrOXYzine HCl (ATARAX) 25 MG tablet Take 1 tablet by mouth every 8 hours as needed for Itching 45 tablet 0    metFORMIN (GLUCOPHAGE-XR) 500 MG extended release tablet Take 2 tablets by mouth 2 times daily (with meals) 360 tablet 1    aspirin (JOVANA ASPIRIN) 325 MG tablet Take 1 tablet by mouth daily 30 tablet 3    Ferrous Sulfate Dried ER (SLOW RELEASE IRON) 45 MG TBCR Take 1 tablet by mouth daily 90 tablet 1    fluticasone (FLONASE) 50 MCG/ACT nasal spray 1 spray by Each Nostril route daily 2 Bottle 1    Lancets MISC Check blood sugars once daily 100 each 3    blood glucose monitor strips Check blood sugars once daily 100 strip 3    fluticasone (FLONASE) 50 MCG/ACT nasal spray 2 sprays by Each Nostril route daily (Patient not taking: Reported on 2021) 48 g 1     No current facility-administered medications for this visit.         Patient Care Team:  MIGDALIA Brizuela CNP as PCP - General (Nurse Practitioner)  MIGDALIA [facility]rizuela CNP [facility]CP - [facility]  Amrit Smith MD as Consulting Physician (Endocrinology)  Callum Gracia MD as Consulting Physician (Podiatry)    REVIEW OF SYSTEMS:     Review of Systems Constitutional:  Negative for activity change, fatigue and unexpected weight change. HENT:  Negative for congestion, ear pain, hearing loss, rhinorrhea and sore throat. Respiratory:  Positive for cough (morning cough all the time. ). Negative for shortness of breath. Cardiovascular:  Positive for chest pain and palpitations. Negative for leg swelling. Gastrointestinal:  Positive for diarrhea. Negative for constipation. Endocrine:        Increased hotflashes   Musculoskeletal:  Negative for arthralgias and gait problem. Intermittent pain right flank for the last month   Neurological:  Negative for dizziness, weakness and headaches. Psychiatric/Behavioral:  Positive for sleep disturbance. Negative for confusion. The patient is not nervous/anxious. HISTORY OF PRESENT ILLNESS         Diabetes  Patient is currently treated for type 2 diabetes. Is been ongoing treatment for over a year and/or longer. Medications have been reviewed. Patient reports that they are taking medications as indicated or prescribed. Blood glucose levels are stabilized with minimal to no change. Patient denies any hypoglycemic events. Denies any change to vision. Patient is overall stable with his medications. Feels as if she seen - has something stuck in her through. Hypertension  Blood pressure evaluated today is stable. Medication review has been completed as documented. Patient is currently taking medication as prescribed or as indicated. Patient denies any chest pain, shortness of breath, or palpitations. Patient is overall stable. Patient does however present with a laundry list of items for which she wants to discuss. 1 being most prominent as she still has an intermittent dry cough she still feels as if there is something stuck in her throat. Patient has been referred to ENT and has been advised that she needs to schedule appointment to have that evaluated appropriately.     Also concerned that her bowel movements have been very intermittent in consistency regarding diarrhea and then at times thin and stringy. This has been going on for the past few months. She is also having an increase in urinary incontinence, as well as urinary frequency. We will test her for urinalysis today run a KUB due to some right flank pain for which she has been complaining about for the last month intermittently. Can also reports that she has been having increased chest pressure and pain over the last month. Is been intermittent similar to the complaints that she had last fall we did do a stress test that was negative as well as an EKG which showed some abnormality. She was not seen by cardiology at that time. We have run a new EKG today which does show her normal sinus rhythm actually improvement from her previous EKG. However I am still running serial troponin labs, an echocardiogram has been ordered at this time as well as a referral to cardiology. As this has been a longstanding complaint and want to rule out anything pertinent besides anxiety. She does report that she has had an increase in overall hot flashes, she has been on Celexa for several years. Her anxiety is also been increased we did discuss utilizing some as needed Atarax when she has increasing anxiety and panic. Want to rule out cardiac in regards to her chest pain we will then discuss if Celexa needs to be changed. Patient follows with endocrinology for her thyroid she does report that her last check was in March and it was normal at that time. Patient also presents with a conjunctival hematoma on the anterior lateral edge of her left eye. Reports this started yesterday, the does not appear to be any bacteria or any drainage at this time patient has been advised to monitor it should go away on its own.     Subderal hematoma of the left eye started yesteder      PHYSICAL EXAM:     /88 (Site: Left Upper Arm, Position: Sitting, Cuff Size: Large Adult)   Pulse 67   Temp 98.4 °F (36.9 °C) (Tympanic)   Ht 5' 0.75\" (1.543 m)   Wt 188 lb 6.4 oz (85.5 kg)   LMP 06/21/2006   SpO2 98%   BMI 35.89 kg/m²      Physical Exam  Vitals reviewed. Constitutional:       Appearance: Normal appearance. She is not ill-appearing. Cardiovascular:      Rate and Rhythm: Normal rate and regular rhythm. Heart sounds: No murmur heard. No friction rub. No gallop. Pulmonary:      Effort: Pulmonary effort is normal. No respiratory distress. Breath sounds: No wheezing. Abdominal:      General: Bowel sounds are normal.      Palpations: Abdomen is soft. Tenderness: There is no abdominal tenderness. Musculoskeletal:      Right lower leg: No edema. Left lower leg: No edema. Skin:     General: Skin is warm. Findings: No erythema, lesion or rash. Neurological:      Mental Status: She is alert. Psychiatric:         Mood and Affect: Mood normal.         Behavior: Behavior is cooperative. Visual inspection:  Deformity/amputation: absent  Skin lesions/pre-ulcerative calluses: absent  Edema: right- negative, left- negative    Sensory exam:  Monofilament sensation: normal  (minimum of 5 random plantar locations tested, avoiding callused areas - > 1 area with absence of sensation is + for neuropathy)    Plus at least one of the following:  Pulses: normal,   Pinprick: N/A  Proprioception: Intact  Vibration (128 Hz): N/A    PROCEDURE/ IN OFFICE TESTING/ LAB REVIEW     EKG and POCT URINALYSIS    Results for orders placed or performed in visit on 09/01/22 (from the past 168 hour(s))   POCT glycosylated hemoglobin (Hb A1C)    Collection Time: 09/01/22  9:58 AM   Result Value Ref Range    Hemoglobin A1C 5.6 %        ASSESSMENT/PLAN/ FOLLOWUP:     PLEASE NOTE THAT ANY DISCONTINUATION OF MEDICATIONS OR MEDICAL SUPPLIES REFLECTED IN TODAY'S VISIT SUMMARY  MAY NOT HAVE COMPLETED AS A CHANGE IN YOUR PLAN OF CARE.  THESE CHANGES MAY HAVE ONLY BEEN DONE SO IN ORDER TO CLEAN UP LIST FROM DUPLICATIONS OR MISCELLANEOUS SUPPLIES ONLY NEEDED PERIODIC REORDERS. DO NOT DISCONTINUE MEDICATIONS LISTED UNLESS SPECIFICALLY DISCUSSED IN YOUR APPOINTMENT WITH PROVIDER OR SPECIALIST, IF YOU HAVE AN QUESTIONS, PLEASE CONTACT YOUR PROVIDER FOR CLARIFICATION IF NOT ADDRESSED IN YOUR PLAN OF CARE. 1. Type 2 diabetes mellitus without complication, without long-term current use of insulin (HCC)  -     POCT glycosylated hemoglobin (Hb A1C)  -      DIABETES FOOT EXAM  2. Need for pneumococcal vaccination  -     Pneumococcal, PCV20, PREVNAR 21, (age 25 yrs+), IM, PF  3. Essential hypertension  -     losartan (COZAAR) 100 MG tablet; Take 1 tablet by mouth daily, Disp-90 tablet, R-1Normal  -     hydroCHLOROthiazide (HYDRODIURIL) 25 MG tablet; Take 1 tablet by mouth daily, Disp-90 tablet, R-1Normal  4. Hyperlipidemia, unspecified hyperlipidemia type  -     simvastatin (ZOCOR) 40 MG tablet; Take 1 tablet by mouth nightly, Disp-90 tablet, R-1Normal  -     CBC; Future  -     Comprehensive Metabolic Panel, Fasting; Future  -     Lipid Panel; Future  5. Major depressive disorder, single episode, mild (HCC)  -     citalopram (CELEXA) 40 MG tablet; Take 1 tablet by mouth daily, Disp-90 tablet, R-1Normal  6. Generalized anxiety disorder  -     citalopram (CELEXA) 40 MG tablet; Take 1 tablet by mouth daily, Disp-90 tablet, R-1Normal  -     hydrOXYzine HCl (ATARAX) 25 MG tablet; Take 1 tablet by mouth every 8 hours as needed for Itching, Disp-45 tablet, R-0Normal  7. Chest pain at rest  -     Echocardiogram complete; Future  -     XR CHEST STANDARD (2 VW); Future  8. Intermittent chest pain  -     Troponin; Future  -     EKG 12 Lead  -     AFL - Duran, Rosalie, DO, Cardiology, [place]  -     XR CHEST STANDARD (2 VW); Future  9. Right flank pain  -     POCT Urinalysis no Micro  -     XR ABDOMEN (KUB) (SINGLE AP VIEW);  Future      Return in about 5 weeks (around 10/6/2022), or if symptoms worsen or fail to improve. COMMUNICATION:       On this date 9/1/2022 I have spent 40 minutes reviewing previous notes, test results and face to face with the patient discussing the diagnosis and importance of compliance with the treatment plan as well as documenting on the day of the visit. The best way to find yourself is to lose yourself in the service of others - [address] [place] [address]. [address] [facility]   [email or web address]  Office: [phone]     An electronic signature was used to authenticate this note.   Signed by MIGDALIA Harden CNP, APRN-CNP on 9/1/2022 at 10:32 AM

## 2022-09-01 NOTE — PATIENT INSTRUCTIONS
PLEASE NOTE THAT ANY DISCONTINUATION OF MEDICATIONS OR MEDICAL SUPPLIES REFLECTED IN TODAY'S VISIT SUMMARY  MAY NOT HAVE COMPLETED AS A CHANGE IN YOUR PLAN OF CARE. THESE CHANGES MAY HAVE ONLY BEEN DONE SO IN ORDER TO CLEAN UP LIST FROM DUPLICATIONS OR MISCELLANEOUS SUPPLIES ONLY NEEDED PERIODIC REORDERS. DO NOT DISCONTINUE MEDICATIONS LISTED UNLESS SPECIFICALLY DISCUSSED IN YOUR APPOINTMENT WITH PROVIDER OR SPECIALIST, IF YOU HAVE AN QUESTIONS, PLEASE CONTACT YOUR PROVIDER FOR CLARIFICATION IF NOT ADDRESSED IN YOUR PLAN OF CARE. It was my pleasure to meet with you today. Please contact me with any questions or concerns, and please notify myself or our manager if there is anyway we can improve our service in your health care needs.  Below I have listed some instructions and information that pertain to today's visit.    -You have been advised to continue all current medication, otherwise not discussed in today's visit  -New medications and refills will been sent and made available at pharmacy or mail away  -Complete  non-fasting labs and/any other testing ordered prior to next scheduled followup

## 2022-09-02 LAB
ALBUMIN SERPL-MCNC: 4.6 G/DL (ref 3.5–5.2)
ALBUMIN/GLOBULIN RATIO: 1.8 (ref 1–2.5)
ALP BLD-CCNC: 53 U/L (ref 35–104)
ALT SERPL-CCNC: 23 U/L (ref 5–33)
ANION GAP SERPL CALCULATED.3IONS-SCNC: 11 MMOL/L (ref 9–17)
AST SERPL-CCNC: 30 U/L
BILIRUB SERPL-MCNC: 0.5 MG/DL (ref 0.3–1.2)
BUN BLDV-MCNC: 7 MG/DL (ref 8–23)
CALCIUM SERPL-MCNC: 10 MG/DL (ref 8.6–10.4)
CHLORIDE BLD-SCNC: 100 MMOL/L (ref 98–107)
CHOLESTEROL/HDL RATIO: 2.2
CHOLESTEROL: 157 MG/DL
CO2: 28 MMOL/L (ref 20–31)
CREAT SERPL-MCNC: 0.61 MG/DL (ref 0.5–0.9)
ESTIMATED AVERAGE GLUCOSE: 126 MG/DL
GFR AFRICAN AMERICAN: >60 ML/MIN
GFR NON-AFRICAN AMERICAN: >60 ML/MIN
GFR SERPL CREATININE-BSD FRML MDRD: ABNORMAL ML/MIN/{1.73_M2}
GLUCOSE FASTING: 87 MG/DL (ref 70–99)
HBA1C MFR BLD: 6 % (ref 4–6)
HDLC SERPL-MCNC: 73 MG/DL
LDL CHOLESTEROL: 66 MG/DL (ref 0–130)
POTASSIUM SERPL-SCNC: 3.9 MMOL/L (ref 3.7–5.3)
SODIUM BLD-SCNC: 139 MMOL/L (ref 135–144)
TOTAL PROTEIN: 7.1 G/DL (ref 6.4–8.3)
TRIGL SERPL-MCNC: 92 MG/DL
TROPONIN, HIGH SENSITIVITY: <6 NG/L (ref 0–14)

## 2022-10-20 ENCOUNTER — TELEPHONE (OUTPATIENT)
Dept: FAMILY MEDICINE CLINIC | Age: 67
End: 2022-10-20

## 2022-10-20 DIAGNOSIS — Z12.31 SCREENING MAMMOGRAM FOR BREAST CANCER: Primary | ICD-10-CM

## 2022-10-20 DIAGNOSIS — E88.81 DYSMETABOLIC SYNDROME X: ICD-10-CM

## 2022-10-20 RX ORDER — METFORMIN HYDROCHLORIDE 500 MG/1
1000 TABLET, EXTENDED RELEASE ORAL 2 TIMES DAILY WITH MEALS
Qty: 1440 TABLET | Refills: 1 | OUTPATIENT
Start: 2022-10-20 | End: 2023-10-20

## 2022-10-20 RX ORDER — METFORMIN HYDROCHLORIDE 500 MG/1
1000 TABLET, EXTENDED RELEASE ORAL 2 TIMES DAILY WITH MEALS
Qty: 360 TABLET | Refills: 0 | Status: SHIPPED | OUTPATIENT
Start: 2022-10-20 | End: 2023-10-20

## 2022-10-20 NOTE — TELEPHONE ENCOUNTER
Last visit: 9/1/22  Last Med refill: 3/1/22  Does patient have enough medication for 72 hours: No: completely out    Next Visit Date:  No future appointments. Health Maintenance   Topic Date Due    Diabetic retinal exam  05/06/2020    Flu vaccine (1) 08/01/2022    Diabetic microalbuminuria test  11/12/2022    COVID-19 Vaccine (4 - Booster for Moderna series) 09/01/2023 (Originally 4/3/2022)    DTaP/Tdap/Td vaccine (2 - Td or Tdap) 02/27/2023    Diabetic foot exam  09/01/2023    A1C test (Diabetic or Prediabetic)  09/01/2023    Lipids  09/01/2023    Depression Monitoring  09/01/2023    Breast cancer screen  11/15/2023    Colorectal Cancer Screen  08/19/2024    Cervical cancer screen  12/09/2024    DEXA (modify frequency per FRAX score)  Completed    Shingles vaccine  Completed    Pneumococcal 65+ years Vaccine  Completed    Hepatitis C screen  Completed    HIV screen  Completed    Hepatitis A vaccine  Aged Out    Hib vaccine  Aged Out    Meningococcal (ACWY) vaccine  Aged Out       Hemoglobin A1C (%)   Date Value   09/01/2022 6.0   09/01/2022 5.6   11/12/2021 6.2             ( goal A1C is < 7)   Microalb/Crt.  Ratio (mcg/mg creat)   Date Value   10/04/2018 26 (H)     LDL Cholesterol (mg/dL)   Date Value   09/01/2022 66   10/02/2018 115     LDL Calculated (mg/dL)   Date Value   11/12/2021 70   08/12/2020 60       (goal LDL is <100)   AST (U/L)   Date Value   09/01/2022 30     ALT (U/L)   Date Value   09/01/2022 23     BUN (mg/dL)   Date Value   09/01/2022 7 (L)     BP Readings from Last 3 Encounters:   09/01/22 130/88   12/09/21 124/84   10/11/21 124/84          (goal 120/80)    All Future Testing planned in [facility]  Lab Frequency Next Occurrence   Echocardiogram complete Once 09/01/2022   XR ABDOMEN (KUB) (SINGLE AP VIEW) Once 09/01/2022   XR CHEST STANDARD (2 VW) Once 09/01/2022               Patient Active Problem List:     Dysmetabolic syndrome X     Essential hypertension     Hyperlipidemia     Atypical lobular hyperplasia of left breast     Nephrolithiasis     Major depressive disorder, single episode, mild (HCC)     Microalbuminuria     Type 2 diabetes mellitus without complication, without long-term current use of insulin ([facility] [address]

## 2022-10-20 NOTE — TELEPHONE ENCOUNTER
Last visit: 9/1/22  Last Med refill: 3/1/22  Does patient have enough medication for 72 hours: No: med pending to local pharmacy until this comes in    Next Visit Date:  No future appointments. Health Maintenance   Topic Date Due    Diabetic retinal exam  05/06/2020    Flu vaccine (1) 08/01/2022    Diabetic microalbuminuria test  11/12/2022    COVID-19 Vaccine (4 - Booster for Moderna series) 09/01/2023 (Originally 4/3/2022)    DTaP/Tdap/Td vaccine (2 - Td or Tdap) 02/27/2023    Diabetic foot exam  09/01/2023    A1C test (Diabetic or Prediabetic)  09/01/2023    Lipids  09/01/2023    Depression Monitoring  09/01/2023    Breast cancer screen  11/15/2023    Colorectal Cancer Screen  08/19/2024    Cervical cancer screen  12/09/2024    DEXA (modify frequency per FRAX score)  Completed    Shingles vaccine  Completed    Pneumococcal 65+ years Vaccine  Completed    Hepatitis C screen  Completed    HIV screen  Completed    Hepatitis A vaccine  Aged Out    Hib vaccine  Aged Out    Meningococcal (ACWY) vaccine  Aged Out       Hemoglobin A1C (%)   Date Value   09/01/2022 6.0   09/01/2022 5.6   11/12/2021 6.2             ( goal A1C is < 7)   Microalb/Crt.  Ratio (mcg/mg creat)   Date Value   10/04/2018 26 (H)     LDL Cholesterol (mg/dL)   Date Value   09/01/2022 66   10/02/2018 115     LDL Calculated (mg/dL)   Date Value   11/12/2021 70   08/12/2020 60       (goal LDL is <100)   AST (U/L)   Date Value   09/01/2022 30     ALT (U/L)   Date Value   09/01/2022 23     BUN (mg/dL)   Date Value   09/01/2022 7 (L)     BP Readings from Last 3 Encounters:   09/01/22 130/88   12/09/21 124/84   10/11/21 124/84          (goal 120/80)    All Future Testing planned in [facility]  Lab Frequency Next Occurrence   Echocardiogram complete Once 09/01/2022   XR ABDOMEN (KUB) (SINGLE AP VIEW) Once 09/01/2022   XR CHEST STANDARD (2 VW) Once 09/01/2022               Patient Active Problem List:     Dysmetabolic syndrome X     Essential hypertension Hyperlipidemia     Atypical lobular hyperplasia of left breast     Nephrolithiasis     Major depressive disorder, single episode, mild (HCC)     Microalbuminuria     Type 2 diabetes mellitus without complication, without long-term current use of insulin ([facility] [address]

## 2022-10-20 NOTE — TELEPHONE ENCOUNTER
Pt requesting yearly mammogram to be ordered. Last done 11/12/21. Order pending. Please mail to pt once order is signed.

## 2023-01-19 DIAGNOSIS — E78.5 HYPERLIPIDEMIA, UNSPECIFIED HYPERLIPIDEMIA TYPE: ICD-10-CM

## 2023-01-20 DIAGNOSIS — F41.1 GENERALIZED ANXIETY DISORDER: ICD-10-CM

## 2023-01-20 DIAGNOSIS — I10 ESSENTIAL HYPERTENSION: ICD-10-CM

## 2023-01-20 DIAGNOSIS — F32.0 MAJOR DEPRESSIVE DISORDER, SINGLE EPISODE, MILD (HCC): ICD-10-CM

## 2023-01-20 RX ORDER — LOSARTAN POTASSIUM 100 MG/1
100 TABLET ORAL DAILY
Qty: 90 TABLET | Refills: 3 | Status: SHIPPED | OUTPATIENT
Start: 2023-01-20 | End: 2024-01-20

## 2023-01-20 RX ORDER — SIMVASTATIN 40 MG
TABLET ORAL
Qty: 90 TABLET | Refills: 3 | Status: SHIPPED | OUTPATIENT
Start: 2023-01-20

## 2023-01-20 RX ORDER — HYDROCHLOROTHIAZIDE 25 MG/1
25 TABLET ORAL DAILY
Qty: 90 TABLET | Refills: 3 | Status: SHIPPED | OUTPATIENT
Start: 2023-01-20 | End: 2024-01-20

## 2023-01-20 RX ORDER — CITALOPRAM 40 MG/1
40 TABLET ORAL DAILY
Qty: 90 TABLET | Refills: 3 | Status: SHIPPED | OUTPATIENT
Start: 2023-01-20 | End: 2024-01-20

## 2023-01-20 NOTE — TELEPHONE ENCOUNTER
Last visit: 9/1/2022    Last Med refill: 09/01/2022  Does patient have enough medication for 72 hours: Yes    Next Visit Date:  No future appointments. Health Maintenance   Topic Date Due    Diabetic retinal exam  05/06/2020    Flu vaccine (1) 08/01/2022    Diabetic Alb to Cr ratio (uACR) test  11/12/2022    COVID-19 Vaccine (4 - Booster for Moderna series) 09/01/2023 (Originally 1/28/2022)    DTaP/Tdap/Td vaccine (2 - Td or Tdap) 02/27/2023    Diabetic foot exam  09/01/2023    A1C test (Diabetic or Prediabetic)  09/01/2023    Lipids  09/01/2023    Depression Monitoring  09/01/2023    GFR test (Diabetes, CKD 3-4, OR last GFR 15-59)  09/01/2023    Breast cancer screen  11/15/2023    Colorectal Cancer Screen  08/19/2024    Cervical cancer screen  12/09/2024    DEXA (modify frequency per FRAX score)  Completed    Shingles vaccine  Completed    Pneumococcal 65+ years Vaccine  Completed    Hepatitis C screen  Completed    HIV screen  Completed    Hepatitis A vaccine  Aged Out    Hib vaccine  Aged Out    Meningococcal (ACWY) vaccine  Aged Out       Hemoglobin A1C (%)   Date Value   09/01/2022 6.0   09/01/2022 5.6   11/12/2021 6.2             ( goal A1C is < 7)   Microalb/Crt.  Ratio (mcg/mg creat)   Date Value   10/04/2018 26 (H)     LDL Cholesterol (mg/dL)   Date Value   09/01/2022 66   10/02/2018 115     LDL Calculated (mg/dL)   Date Value   11/12/2021 70   08/12/2020 60       (goal LDL is <100)   AST (U/L)   Date Value   09/01/2022 30     ALT (U/L)   Date Value   09/01/2022 23     BUN (mg/dL)   Date Value   09/01/2022 7 (L)     BP Readings from Last 3 Encounters:   09/01/22 130/88   12/09/21 124/84   10/11/21 124/84          (goal 120/80)    All Future Testing planned in [facility]  Lab Frequency Next Occurrence   XR ABDOMEN (KUB) (SINGLE AP VIEW) Once 09/01/2022   XR CHEST STANDARD (2 VW) Once 09/01/2022   DANII DIGITAL SCREEN W OR WO CAD BILATERAL Once 10/20/2022               Patient Active Problem List: Dysmetabolic syndrome X     Essential hypertension     Hyperlipidemia     Atypical lobular hyperplasia of left breast     Nephrolithiasis     Major depressive disorder, single episode, mild (HCC)     Microalbuminuria     Type 2 diabetes mellitus without complication, without long-term current use of insulin ([facility] [address]

## 2023-01-20 NOTE — TELEPHONE ENCOUNTER
Last visit: 9/1/2022    Last Med refill: 09/01/2022  Does patient have enough medication for 72 hours: Yes    Next Visit Date:  No future appointments. Health Maintenance   Topic Date Due    Diabetic retinal exam  05/06/2020    Flu vaccine (1) 08/01/2022    Diabetic Alb to Cr ratio (uACR) test  11/12/2022    COVID-19 Vaccine (4 - Booster for Moderna series) 09/01/2023 (Originally 1/28/2022)    DTaP/Tdap/Td vaccine (2 - Td or Tdap) 02/27/2023    Diabetic foot exam  09/01/2023    A1C test (Diabetic or Prediabetic)  09/01/2023    Lipids  09/01/2023    Depression Monitoring  09/01/2023    GFR test (Diabetes, CKD 3-4, OR last GFR 15-59)  09/01/2023    Breast cancer screen  11/15/2023    Colorectal Cancer Screen  08/19/2024    Cervical cancer screen  12/09/2024    DEXA (modify frequency per FRAX score)  Completed    Shingles vaccine  Completed    Pneumococcal 65+ years Vaccine  Completed    Hepatitis C screen  Completed    HIV screen  Completed    Hepatitis A vaccine  Aged Out    Hib vaccine  Aged Out    Meningococcal (ACWY) vaccine  Aged Out       Hemoglobin A1C (%)   Date Value   09/01/2022 6.0   09/01/2022 5.6   11/12/2021 6.2             ( goal A1C is < 7)   Microalb/Crt.  Ratio (mcg/mg creat)   Date Value   10/04/2018 26 (H)     LDL Cholesterol (mg/dL)   Date Value   09/01/2022 66   10/02/2018 115     LDL Calculated (mg/dL)   Date Value   11/12/2021 70   08/12/2020 60       (goal LDL is <100)   AST (U/L)   Date Value   09/01/2022 30     ALT (U/L)   Date Value   09/01/2022 23     BUN (mg/dL)   Date Value   09/01/2022 7 (L)     BP Readings from Last 3 Encounters:   09/01/22 130/88   12/09/21 124/84   10/11/21 124/84          (goal 120/80)    All Future Testing planned in [facility]  Lab Frequency Next Occurrence   XR ABDOMEN (KUB) (SINGLE AP VIEW) Once 09/01/2022   XR CHEST STANDARD (2 VW) Once 09/01/2022   DANII DIGITAL SCREEN W OR WO CAD BILATERAL Once 10/20/2022               Patient Active Problem List: Dysmetabolic syndrome X     Essential hypertension     Hyperlipidemia     Atypical lobular hyperplasia of left breast     Nephrolithiasis     Major depressive disorder, single episode, mild (HCC)     Microalbuminuria     Type 2 diabetes mellitus without complication, without long-term current use of insulin ([facility] [address]

## 2023-01-24 ENCOUNTER — HOSPITAL ENCOUNTER (OUTPATIENT)
Age: 68
Setting detail: SPECIMEN
Discharge: HOME | End: 2023-01-24

## 2023-01-24 ENCOUNTER — OFFICE VISIT (OUTPATIENT)
Dept: FAMILY MEDICINE CLINIC | Age: 68
End: 2023-01-24

## 2023-01-24 VITALS
TEMPERATURE: 98.6 F | OXYGEN SATURATION: 98 % | DIASTOLIC BLOOD PRESSURE: 84 MMHG | HEART RATE: 68 BPM | WEIGHT: 198 LBS | SYSTOLIC BLOOD PRESSURE: 128 MMHG | BODY MASS INDEX: 37.72 KG/M2

## 2023-01-24 DIAGNOSIS — E78.5 HYPERLIPIDEMIA, UNSPECIFIED HYPERLIPIDEMIA TYPE: ICD-10-CM

## 2023-01-24 DIAGNOSIS — I10 ESSENTIAL HYPERTENSION: ICD-10-CM

## 2023-01-24 DIAGNOSIS — F32.0 MAJOR DEPRESSIVE DISORDER, SINGLE EPISODE, MILD (HCC): ICD-10-CM

## 2023-01-24 DIAGNOSIS — R10.9 LEFT FLANK PAIN: ICD-10-CM

## 2023-01-24 DIAGNOSIS — E88.81 DYSMETABOLIC SYNDROME X: ICD-10-CM

## 2023-01-24 DIAGNOSIS — F41.1 GENERALIZED ANXIETY DISORDER: ICD-10-CM

## 2023-01-24 DIAGNOSIS — R30.0 DYSURIA: ICD-10-CM

## 2023-01-24 DIAGNOSIS — R10.30 LOWER ABDOMINAL PAIN: ICD-10-CM

## 2023-01-24 DIAGNOSIS — R10.84 GENERALIZED ABDOMINAL PAIN: Primary | ICD-10-CM

## 2023-01-24 DIAGNOSIS — N20.0 NEPHROLITHIASIS: ICD-10-CM

## 2023-01-24 LAB
BILIRUBIN, POC: ABNORMAL
BLOOD URINE, POC: ABNORMAL
CLARITY, POC: CLEAR
COLOR, POC: YELLOW
GLUCOSE URINE, POC: ABNORMAL
KETONES, POC: ABNORMAL
LEUKOCYTE EST, POC: ABNORMAL
NITRITE, POC: ABNORMAL
PH, POC: 6.5
PROTEIN, POC: ABNORMAL
SPECIFIC GRAVITY, POC: 1.02
UROBILINOGEN, POC: 0.2

## 2023-01-24 RX ORDER — LOSARTAN POTASSIUM 100 MG/1
100 TABLET ORAL DAILY
Qty: 90 TABLET | Refills: 0 | OUTPATIENT
Start: 2023-01-24 | End: 2024-01-24

## 2023-01-24 RX ORDER — CITALOPRAM 40 MG/1
40 TABLET ORAL DAILY
Qty: 90 TABLET | Refills: 0 | Status: SHIPPED | OUTPATIENT
Start: 2023-01-24 | End: 2024-01-24

## 2023-01-24 RX ORDER — SIMVASTATIN 40 MG
TABLET ORAL
Qty: 90 TABLET | Refills: 0 | OUTPATIENT
Start: 2023-01-24

## 2023-01-24 RX ORDER — LOSARTAN POTASSIUM 100 MG/1
100 TABLET ORAL DAILY
Qty: 90 TABLET | Refills: 0 | Status: SHIPPED | OUTPATIENT
Start: 2023-01-24 | End: 2024-01-24

## 2023-01-24 RX ORDER — METFORMIN HYDROCHLORIDE 500 MG/1
1000 TABLET, EXTENDED RELEASE ORAL 2 TIMES DAILY WITH MEALS
Qty: 360 TABLET | Refills: 0 | Status: SHIPPED | OUTPATIENT
Start: 2023-01-24 | End: 2024-01-24

## 2023-01-24 RX ORDER — SIMVASTATIN 40 MG
TABLET ORAL
Qty: 90 TABLET | Refills: 0 | Status: SHIPPED | OUTPATIENT
Start: 2023-01-24

## 2023-01-24 RX ORDER — CITALOPRAM 40 MG/1
40 TABLET ORAL DAILY
Qty: 90 TABLET | Refills: 0 | OUTPATIENT
Start: 2023-01-24 | End: 2024-01-24

## 2023-01-24 RX ORDER — METFORMIN HYDROCHLORIDE 500 MG/1
1000 TABLET, EXTENDED RELEASE ORAL 2 TIMES DAILY WITH MEALS
Qty: 360 TABLET | Refills: 0 | OUTPATIENT
Start: 2023-01-24 | End: 2024-01-24

## 2023-01-24 RX ORDER — AMOXICILLIN AND CLAVULANATE POTASSIUM 875; 125 MG/1; MG/1
TABLET, FILM COATED ORAL
COMMUNITY
Start: 2022-10-19 | End: 2023-01-26 | Stop reason: ALTCHOICE

## 2023-01-24 RX ORDER — HYDROCHLOROTHIAZIDE 25 MG/1
25 TABLET ORAL DAILY
Qty: 90 TABLET | Refills: 0 | Status: SHIPPED | OUTPATIENT
Start: 2023-01-24 | End: 2024-01-24

## 2023-01-24 RX ORDER — HYDROCHLOROTHIAZIDE 25 MG/1
25 TABLET ORAL DAILY
Qty: 90 TABLET | Refills: 0 | OUTPATIENT
Start: 2023-01-24 | End: 2024-01-24

## 2023-01-25 RX ORDER — PHENAZOPYRIDINE HYDROCHLORIDE 100 MG/1
100 TABLET, FILM COATED ORAL 3 TIMES DAILY PRN
Qty: 9 TABLET | Refills: 0 | Status: SHIPPED | OUTPATIENT
Start: 2023-01-25 | End: 2023-01-28

## 2023-01-25 RX ORDER — NITROFURANTOIN 25; 75 MG/1; MG/1
100 CAPSULE ORAL 2 TIMES DAILY
Qty: 20 CAPSULE | Refills: 0 | Status: SHIPPED | OUTPATIENT
Start: 2023-01-25 | End: 2023-02-04

## 2023-01-26 LAB
CULTURE: ABNORMAL
SPECIMEN DESCRIPTION: ABNORMAL

## 2023-02-12 ASSESSMENT — ENCOUNTER SYMPTOMS
SHORTNESS OF BREATH: 0
SORE THROAT: 0
COUGH: 0
RHINORRHEA: 0
CONSTIPATION: 0
DIARRHEA: 0

## 2023-02-13 NOTE — PROGRESS NOTES
[address] [facility]   [address] [address]  [phone]    2023     CHIEF COMPLAINT:     Phillip Gaytan (:  1955) is a 79 y.o. female, here for evaluation of the following chief complaint(s):  Urinary Frequency (Pt states that she goes 8-10x day, having abdominal pain and urine smells. )      REVIEWED INFORMATION      Allergies   Allergen Reactions    Sulfa Antibiotics Hives and Swelling       Current Outpatient Medications   Medication Sig Dispense Refill    simvastatin (ZOCOR) 40 MG tablet TAKE 1 TABLET BY MOUTH AT  NIGHT 90 tablet 0    metFORMIN (GLUCOPHAGE-XR) 500 MG extended release tablet Take 2 tablets by mouth 2 times daily (with meals) 360 tablet 0    losartan (COZAAR) 100 MG tablet Take 1 tablet by mouth daily 90 tablet 0    hydroCHLOROthiazide (HYDRODIURIL) 25 MG tablet Take 1 tablet by mouth daily 90 tablet 0    citalopram (CELEXA) 40 MG tablet Take 1 tablet by mouth daily 90 tablet 0    fluticasone (FLONASE) 50 MCG/ACT nasal spray 2 sprays by Each Nostril route daily 48 g 1    aspirin (JOVANA ASPIRIN) 325 MG tablet Take 1 tablet by mouth daily 30 tablet 3    Ferrous Sulfate Dried ER (SLOW RELEASE IRON) 45 MG TBCR Take 1 tablet by mouth daily 90 tablet 1    fluticasone (FLONASE) 50 MCG/ACT nasal spray 1 spray by Each Nostril route daily 2 Bottle 1    Lancets MISC Check blood sugars once daily 100 each 3    blood glucose monitor strips Check blood sugars once daily 100 strip 3     No current facility-administered medications for this visit. Patient Care Team:  MIGDALIA Xiao CNP as PCP - General (Nurse Practitioner)  MIGDALIA Xiao CNP as PCP - Empaneled Provider  Galo Ledbetter MD as Consulting Physician (Endocrinology)  Hafsa Mcgraw MD as Consulting Physician (Podiatry)    REVIEW OF SYSTEMS:     Review of Systems   Constitutional:  Negative for activity change, fatigue and unexpected weight change.    HENT:  Negative for congestion, ear pain, hearing loss, rhinorrhea and sore throat. Respiratory:  Negative for cough and shortness of breath. Cardiovascular:  Negative for chest pain, palpitations and leg swelling. Gastrointestinal:  Negative for constipation and diarrhea. Genitourinary:  Positive for flank pain and frequency. Musculoskeletal:  Negative for arthralgias and gait problem. Neurological:  Negative for dizziness, weakness and headaches. Psychiatric/Behavioral:  Negative for confusion. The patient is not nervous/anxious. HISTORY OF PRESENT ILLNESS     Hypertension  Blood pressure evaluated today is stable. Medication review has been completed as documented. Patient is currently taking medication as prescribed or as indicated. Patient denies any chest pain, shortness of breath, or palpitations. Patient is overall stable. Medication currently include: Losartan, hydrochlorothiazide, continuation of these medications discussed, with refills given at appointment, or when requested. Patient also reports that she has had increasing frequency with urination as well as flank pain. Patient does have a history of kidney stones. We are concerned that multiple kidney stones may have caused an issue in which she has had continued bladder infections. We will start with a KUB. If there is no improvement I have advised patient to have a CT abdomen. I will also forward her with a referral to her urology for continued evaluation of nephro lithiasis. PHYSICAL EXAM:     /84 (Site: Left Upper Arm, Position: Sitting, Cuff Size: Large Adult)   Pulse 68   Temp 98.6 °F (37 °C)   Wt 198 lb (89.8 kg)   LMP 06/21/2006   SpO2 98%   BMI 37.72 kg/m²      Physical Exam  Vitals reviewed. Constitutional:       Appearance: Normal appearance. She is not ill-appearing. Cardiovascular:      Rate and Rhythm: Normal rate and regular rhythm. Heart sounds: No murmur heard. No friction rub. No gallop.    Pulmonary: Effort: Pulmonary effort is normal. No respiratory distress. Breath sounds: No wheezing. Abdominal:      General: Bowel sounds are normal.      Palpations: Abdomen is soft. Tenderness: There is no abdominal tenderness. Musculoskeletal:      Right lower leg: No edema. Left lower leg: No edema. Skin:     General: Skin is warm. Findings: No erythema, lesion or rash. Neurological:      Mental Status: She is alert. Psychiatric:         Mood and Affect: Mood normal.         Behavior: Behavior is cooperative. PROCEDURE/ IN OFFICE TESTING/ LAB REVIEW     No in office testing or procedures completed during today's office visit. No results found for this or any previous visit (from the past 168 hour(s)). ASSESSMENT/PLAN/ FOLLOWUP:     PLEASE NOTE THAT ANY DISCONTINUATION OF MEDICATIONS OR MEDICAL SUPPLIES REFLECTED IN TODAY'S VISIT SUMMARY  MAY NOT HAVE COMPLETED AS A CHANGE IN YOUR PLAN OF CARE. THESE CHANGES MAY HAVE ONLY BEEN DONE SO IN ORDER TO CLEAN UP LIST FROM DUPLICATIONS OR MISCELLANEOUS SUPPLIES ONLY NEEDED PERIODIC REORDERS. DO NOT DISCONTINUE MEDICATIONS LISTED UNLESS SPECIFICALLY DISCUSSED IN YOUR APPOINTMENT WITH PROVIDER OR SPECIALIST, IF YOU HAVE AN QUESTIONS, PLEASE CONTACT YOUR PROVIDER FOR CLARIFICATION IF NOT ADDRESSED IN YOUR PLAN OF CARE. 1. Generalized abdominal pain  -     POCT Urinalysis No Micro (Auto)  -     XR ABDOMEN (KUB) (SINGLE AP VIEW)  -     Culture, Urine  2. Hyperlipidemia, unspecified hyperlipidemia type  -     simvastatin (ZOCOR) 40 MG tablet; TAKE 1 TABLET BY MOUTH AT  NIGHT, Disp-90 tablet, R-0Requesting 1 year supplyNormal  3. Dysmetabolic syndrome X  -     metFORMIN (GLUCOPHAGE-XR) 500 MG extended release tablet; Take 2 tablets by mouth 2 times daily (with meals), Disp-360 tablet, R-0Normal  4. Essential hypertension  -     losartan (COZAAR) 100 MG tablet;  Take 1 tablet by mouth daily, Disp-90 tablet, R-0Requesting 1 year supplyNormal  -     hydroCHLOROthiazide (HYDRODIURIL) 25 MG tablet; Take 1 tablet by mouth daily, Disp-90 tablet, R-0Requesting 1 year supplyNormal  5. Major depressive disorder, single episode, mild (HCC)  -     citalopram (CELEXA) 40 MG tablet; Take 1 tablet by mouth daily, Disp-90 tablet, R-0Requesting 1 year supplyNormal  6. Generalized anxiety disorder  -     citalopram (CELEXA) 40 MG tablet; Take 1 tablet by mouth daily, Disp-90 tablet, R-0Requesting 1 year supplyNormal  7. Nephrolithiasis  -     CT ABDOMEN PELVIS WO CONTRAST Additional Contrast? None; Future  -     nitrofurantoin, macrocrystal-monohydrate, (MACROBID) 100 MG capsule; Take 1 capsule by mouth 2 times daily for 10 days, Disp-20 capsule, R-0Normal  -     phenazopyridine (PYRIDIUM) 100 MG tablet; Take 1 tablet by mouth 3 times daily as needed for Pain, Disp-9 tablet, R-0Normal  [facility], [facility]  8. Left flank pain  -     CT ABDOMEN PELVIS WO CONTRAST Additional Contrast? None; Denise [facility], [facility]  9. Lower abdominal pain  -     CT ABDOMEN PELVIS WO CONTRAST Additional Contrast? None; [facility]  10. Dysuria  -     nitrofurantoin, macrocrystal-monohydrate, (MACROBID) 100 MG capsule; Take 1 capsule by mouth 2 times daily for 10 days, Disp-20 capsule, R-0Normal  -     phenazopyridine (PYRIDIUM) 100 MG tablet; Take 1 tablet by mouth 3 times daily as needed for Pain, Disp-9 tablet, R-0Normal  -     CHANTEL Manzo MD, [facility]      No follow-ups on file. COMMUNICATION:       On this date 1/24/2023 I have spent 30 minutes reviewing previous notes, test results and face to face with the patient discussing the diagnosis and importance of compliance with the treatment plan as well as documenting on the day of the visit. The best way to find yourself is to lose yourself in the service of others - [address] [place] [address] APRN-CNP  [phone]   [email or web address]  Office: [phone]     An electronic signature was used to authenticate this note.   Signed by MIGDALIA Gonzales CNP, APRN-CNP on 2/12/2023 at 9:03 PM

## 2023-03-06 ENCOUNTER — TELEMEDICINE (OUTPATIENT)
Dept: FAMILY MEDICINE CLINIC | Age: 68
End: 2023-03-06
Payer: COMMERCIAL

## 2023-03-06 DIAGNOSIS — R11.0 NAUSEA: ICD-10-CM

## 2023-03-06 DIAGNOSIS — E11.9 TYPE 2 DIABETES MELLITUS WITHOUT COMPLICATION, WITHOUT LONG-TERM CURRENT USE OF INSULIN (HCC): Primary | ICD-10-CM

## 2023-03-06 PROCEDURE — 99214 OFFICE O/P EST MOD 30 MIN: CPT | Performed by: NURSE PRACTITIONER

## 2023-03-06 PROCEDURE — 1123F ACP DISCUSS/DSCN MKR DOCD: CPT | Performed by: NURSE PRACTITIONER

## 2023-03-06 SDOH — ECONOMIC STABILITY: FOOD INSECURITY: WITHIN THE PAST 12 MONTHS, YOU WORRIED THAT YOUR FOOD WOULD RUN OUT BEFORE YOU GOT MONEY TO BUY MORE.: NEVER TRUE

## 2023-03-06 SDOH — ECONOMIC STABILITY: HOUSING INSECURITY
IN THE LAST 12 MONTHS, WAS THERE A TIME WHEN YOU DID NOT HAVE A STEADY PLACE TO SLEEP OR SLEPT IN A SHELTER (INCLUDING NOW)?: NO

## 2023-03-06 SDOH — ECONOMIC STABILITY: INCOME INSECURITY: HOW HARD IS IT FOR YOU TO PAY FOR THE VERY BASICS LIKE FOOD, HOUSING, MEDICAL CARE, AND HEATING?: NOT HARD AT ALL

## 2023-03-06 SDOH — ECONOMIC STABILITY: FOOD INSECURITY: WITHIN THE PAST 12 MONTHS, THE FOOD YOU BOUGHT JUST DIDN'T LAST AND YOU DIDN'T HAVE MONEY TO GET MORE.: NEVER TRUE

## 2023-03-06 ASSESSMENT — ANXIETY QUESTIONNAIRES
7. FEELING AFRAID AS IF SOMETHING AWFUL MIGHT HAPPEN: 1
4. TROUBLE RELAXING: 2
GAD7 TOTAL SCORE: 13
IF YOU CHECKED OFF ANY PROBLEMS ON THIS QUESTIONNAIRE, HOW DIFFICULT HAVE THESE PROBLEMS MADE IT FOR YOU TO DO YOUR WORK, TAKE CARE OF THINGS AT HOME, OR GET ALONG WITH OTHER PEOPLE: SOMEWHAT DIFFICULT
2. NOT BEING ABLE TO STOP OR CONTROL WORRYING: 1
1. FEELING NERVOUS, ANXIOUS, OR ON EDGE: 3
6. BECOMING EASILY ANNOYED OR IRRITABLE: 3
3. WORRYING TOO MUCH ABOUT DIFFERENT THINGS: 3
5. BEING SO RESTLESS THAT IT IS HARD TO SIT STILL: 0

## 2023-03-06 ASSESSMENT — PATIENT HEALTH QUESTIONNAIRE - PHQ9
9. THOUGHTS THAT YOU WOULD BE BETTER OFF DEAD, OR OF HURTING YOURSELF: 0
SUM OF ALL RESPONSES TO PHQ QUESTIONS 1-9: 0
SUM OF ALL RESPONSES TO PHQ9 QUESTIONS 1 & 2: 0
SUM OF ALL RESPONSES TO PHQ QUESTIONS 1-9: 0
3. TROUBLE FALLING OR STAYING ASLEEP: 0
SUM OF ALL RESPONSES TO PHQ QUESTIONS 1-9: 0
4. FEELING TIRED OR HAVING LITTLE ENERGY: 0
8. MOVING OR SPEAKING SO SLOWLY THAT OTHER PEOPLE COULD HAVE NOTICED. OR THE OPPOSITE, BEING SO FIGETY OR RESTLESS THAT YOU HAVE BEEN MOVING AROUND A LOT MORE THAN USUAL: 0
6. FEELING BAD ABOUT YOURSELF - OR THAT YOU ARE A FAILURE OR HAVE LET YOURSELF OR YOUR FAMILY DOWN: 0
7. TROUBLE CONCENTRATING ON THINGS, SUCH AS READING THE NEWSPAPER OR WATCHING TELEVISION: 0
1. LITTLE INTEREST OR PLEASURE IN DOING THINGS: 0
10. IF YOU CHECKED OFF ANY PROBLEMS, HOW DIFFICULT HAVE THESE PROBLEMS MADE IT FOR YOU TO DO YOUR WORK, TAKE CARE OF THINGS AT HOME, OR GET ALONG WITH OTHER PEOPLE: 0
SUM OF ALL RESPONSES TO PHQ QUESTIONS 1-9: 0
5. POOR APPETITE OR OVEREATING: 0
2. FEELING DOWN, DEPRESSED OR HOPELESS: 0

## 2023-03-06 NOTE — PROGRESS NOTES
Vitals  No data recorded     Physical Exam  Constitutional:       Appearance: Normal appearance. She is well-developed. She is not ill-appearing. Eyes:      General:         Right eye: No discharge. Left eye: No discharge. Extraocular Movements: Extraocular movements intact. Conjunctiva/sclera: Conjunctivae normal.   Neck:      Thyroid: No thyroid mass. Vascular: No JVD. Pulmonary:      Effort: Pulmonary effort is normal. No respiratory distress. Musculoskeletal:      Right shoulder: No deformity. Normal range of motion. Left shoulder: No deformity. Normal range of motion. Cervical back: Normal range of motion. Skin:     General: Skin is moist.      Coloration: Skin is not cyanotic or jaundiced. Findings: No rash. Neurological:      General: No focal deficit present. Mental Status: She is alert and oriented to person, place, and time. Gait: Gait is intact. Psychiatric:         Attention and Perception: Attention normal. She does not perceive auditory or visual hallucinations. Mood and Affect: Mood normal.         Speech: Speech normal.                Uziel Porter was evaluated through a synchronous (real-time) audio-video encounter. The patient (or guardian if applicable) is aware that this is a billable service, which includes applicable co-pays. This Virtual Visit was conducted with patient's (and/or legal guardian's) consent. The visit was conducted pursuant to the emergency declaration under the [address] [place] [address] authority and the [employer] General Act. Patient identification was verified, and a caregiver was present when appropriate.    The patient was located at Home: [address] [place] [address] [address] [address]  Provider was [facility] at [facility] [address] [facility],  [address] [facility], APRN - [facility]

## 2023-03-07 ENCOUNTER — TELEPHONE (OUTPATIENT)
Dept: FAMILY MEDICINE CLINIC | Age: 68
End: 2023-03-07

## 2023-03-07 RX ORDER — ONDANSETRON 4 MG/1
4 TABLET, ORALLY DISINTEGRATING ORAL 3 TIMES DAILY PRN
Qty: 21 TABLET | Refills: 0 | Status: SHIPPED | OUTPATIENT
Start: 2023-03-07

## 2023-03-07 RX ORDER — SEMAGLUTIDE 1.34 MG/ML
INJECTION, SOLUTION SUBCUTANEOUS
Qty: 0.5 ADJUSTABLE DOSE PRE-FILLED PEN SYRINGE | Refills: 1 | Status: SHIPPED | OUTPATIENT
Start: 2023-03-07 | End: 2023-04-18

## 2023-03-15 NOTE — TELEPHONE ENCOUNTER
Patient was stable on her previous medication, we changed due complaints of fatigue. We discussed the GLP1 therapy and started for the weight loss that has been attributed to this medication. There is not a generic version of GLP-1 therapy. There is option to pay out of pocket to speciality pharmacy for compounded Semglutide  for around $200  month. I would encourage patient to call her insurance and ask what there coverage is for medication in the class, including Mounjaro, Trulicity, Victoza, - and weight loss versions Wegovy, and Saxenda. Once she has discussed with her insurance company what her best option are, I will be happy to switch this. In addition, patient may want to look on the manufacture sites, for coupons that she may qualify for under commercial insurance if this pertains to her.

## 2023-03-15 NOTE — TELEPHONE ENCOUNTER
Pt called and wanted an alternative or something generic she could use instead of ozempic due to the price being so much.

## 2023-03-31 ASSESSMENT — ENCOUNTER SYMPTOMS
COUGH: 0
DIARRHEA: 0
SORE THROAT: 0
RHINORRHEA: 0
SHORTNESS OF BREATH: 0
CONSTIPATION: 0

## 2023-04-17 ENCOUNTER — TELEPHONE (OUTPATIENT)
Dept: FAMILY MEDICINE CLINIC | Age: 68
End: 2023-04-17

## 2023-04-17 DIAGNOSIS — E11.9 TYPE 2 DIABETES MELLITUS WITHOUT COMPLICATION, WITHOUT LONG-TERM CURRENT USE OF INSULIN (HCC): ICD-10-CM

## 2023-04-17 NOTE — TELEPHONE ENCOUNTER
Patient had an appointment scheduled for 4/19/2023 to follow up from starting Ozempic but the provider had to cancel due to being out of the office. The patient is rescheduled for 5/9/2023 but she states she will be out of her medication by then. She said Aaron Saunders was going to increase the Ozempic at this appointment. Her last dose of Ozempic 0.5 mg will be on 4/23/2023. She was wondering if Aaron Saunders would send in the new dose prior to her appointment on 5/9/2023? The patient was notified Aaron Saunders is out of the office this week and once she responds we will let her know.

## 2023-04-18 LAB
AVERAGE GLUCOSE: 120
BUN BLDV-MCNC: 11 MG/DL
CALCIUM SERPL-MCNC: 10.1 MG/DL
CHLORIDE BLD-SCNC: 101 MMOL/L
CO2: 29 MMOL/L
CREAT SERPL-MCNC: 0.66 MG/DL
EGFR: NORMAL
GLUCOSE BLD-MCNC: 103 MG/DL
HBA1C MFR BLD: 5.8 %
POTASSIUM SERPL-SCNC: 3.9 MMOL/L
SODIUM BLD-SCNC: 141 MMOL/L

## 2023-04-18 NOTE — TELEPHONE ENCOUNTER
Patient has to stay on 0.5 mg for at least 4 weeks total.  Dose changes can be made every 4 weeks. I have reordered the 0.5mg pen and sent to [facility] for her to resume until she sees BODØ. She could then be titrated up to the 1mg dose. Please advise patient she still has outstanding labs she needs to complete.

## 2023-04-19 NOTE — TELEPHONE ENCOUNTER
Writer called and spoke with the patient and she was notified. She said she will also come in to get her labs completed.

## 2023-05-04 ENCOUNTER — TELEPHONE (OUTPATIENT)
Dept: FAMILY MEDICINE CLINIC | Age: 68
End: 2023-05-04

## 2023-05-04 DIAGNOSIS — E11.9 TYPE 2 DIABETES MELLITUS WITHOUT COMPLICATION, WITHOUT LONG-TERM CURRENT USE OF INSULIN (HCC): ICD-10-CM

## 2023-07-24 DIAGNOSIS — E88.81 DYSMETABOLIC SYNDROME X: ICD-10-CM

## 2023-07-24 RX ORDER — METFORMIN HYDROCHLORIDE 500 MG/1
1000 TABLET, EXTENDED RELEASE ORAL 2 TIMES DAILY WITH MEALS
Qty: 360 TABLET | Refills: 0 | Status: SHIPPED | OUTPATIENT
Start: 2023-07-24 | End: 2024-07-23

## 2023-07-24 NOTE — TELEPHONE ENCOUNTER
LOV 1/24/23   RTO not scheduled  LRF 1/24/23      Patient called and stated that she would like the metformin 500 mg, called into [facility] due to not being able to afford Ozempic 0.5 mg    Controlled Substance Monitoring:    Acute and Chronic Pain Monitoring:   RX Monitoring 8/20/2018   Attestation The Prescription Monitoring Report was requested today but not available.    Periodic Controlled Substance Monitoring -

## 2023-10-05 ENCOUNTER — TELEPHONE (OUTPATIENT)
Dept: FAMILY MEDICINE CLINIC | Age: 68
End: 2023-10-05

## 2023-10-05 NOTE — TELEPHONE ENCOUNTER
----- Message from Gabrielle Gelysamantha sent at 10/5/2023  1:42 PM EDT -----  Subject: Message to Provider    QUESTIONS  Information for Provider? pt needs call back, she was told to ask   daisy to send her last EKG results to dr Karina Esquivel orthopedic surgeon,   ph number [phone] fax [phone], her surgery is Oct 26th and she   needs this faxed before then  ---------------------------------------------------------------------------  --------------  [address]  [phone]; OK to leave message on voicemail  ---------------------------------------------------------------------------  --------------  SCRIPT ANSWERS  Relationship to Patient?  Self

## 2025-06-04 ENCOUNTER — OFFICE VISIT (OUTPATIENT)
Age: 70
End: 2025-06-04

## 2025-06-04 VITALS — WEIGHT: 198 LBS | BODY MASS INDEX: 37.38 KG/M2 | HEIGHT: 61 IN

## 2025-06-04 DIAGNOSIS — M17.12 ARTHRITIS OF LEFT KNEE: Primary | ICD-10-CM

## 2025-06-04 RX ADMIN — METHYLPREDNISOLONE ACETATE 80 MG: 80 INJECTION, SUSPENSION INTRA-ARTICULAR; INTRALESIONAL; INTRAMUSCULAR; SOFT TISSUE at 08:52

## 2025-06-04 RX ADMIN — LIDOCAINE HYDROCHLORIDE 2 ML: 10 INJECTION, SOLUTION INFILTRATION; PERINEURAL at 08:52

## 2025-06-04 NOTE — PROGRESS NOTES
BREAST SURGERY Left     breast biopsy x 2 (benign)    COLONOSCOPY       No family history on file.       Please note that this chart was generated using voice recognition Dragon dictation software.  Although every effort was made to ensure the accuracy of this automated transcription, some errors in transcription may have occurred.

## 2025-06-05 RX ORDER — LIDOCAINE HYDROCHLORIDE 10 MG/ML
2 INJECTION, SOLUTION INFILTRATION; PERINEURAL ONCE
Status: COMPLETED | OUTPATIENT
Start: 2025-06-05 | End: 2025-06-04

## 2025-06-05 RX ORDER — METHYLPREDNISOLONE ACETATE 80 MG/ML
80 INJECTION, SUSPENSION INTRA-ARTICULAR; INTRALESIONAL; INTRAMUSCULAR; SOFT TISSUE ONCE
Status: COMPLETED | OUTPATIENT
Start: 2025-06-05 | End: 2025-06-04

## 2025-07-15 ENCOUNTER — OFFICE VISIT (OUTPATIENT)
Age: 70
End: 2025-07-15
Payer: MEDICARE

## 2025-07-15 VITALS — WEIGHT: 198 LBS | BODY MASS INDEX: 37.38 KG/M2 | HEIGHT: 61 IN

## 2025-07-15 DIAGNOSIS — M17.12 ARTHRITIS OF LEFT KNEE: Primary | ICD-10-CM

## 2025-07-15 PROCEDURE — G8417 CALC BMI ABV UP PARAM F/U: HCPCS | Performed by: ORTHOPAEDIC SURGERY

## 2025-07-15 PROCEDURE — 1159F MED LIST DOCD IN RCRD: CPT | Performed by: ORTHOPAEDIC SURGERY

## 2025-07-15 PROCEDURE — 3017F COLORECTAL CA SCREEN DOC REV: CPT | Performed by: ORTHOPAEDIC SURGERY

## 2025-07-15 PROCEDURE — 4004F PT TOBACCO SCREEN RCVD TLK: CPT | Performed by: ORTHOPAEDIC SURGERY

## 2025-07-15 PROCEDURE — 1125F AMNT PAIN NOTED PAIN PRSNT: CPT | Performed by: ORTHOPAEDIC SURGERY

## 2025-07-15 PROCEDURE — 1123F ACP DISCUSS/DSCN MKR DOCD: CPT | Performed by: ORTHOPAEDIC SURGERY

## 2025-07-15 PROCEDURE — G8427 DOCREV CUR MEDS BY ELIG CLIN: HCPCS | Performed by: ORTHOPAEDIC SURGERY

## 2025-07-15 PROCEDURE — 99214 OFFICE O/P EST MOD 30 MIN: CPT | Performed by: ORTHOPAEDIC SURGERY

## 2025-07-15 PROCEDURE — G8399 PT W/DXA RESULTS DOCUMENT: HCPCS | Performed by: ORTHOPAEDIC SURGERY

## 2025-07-15 PROCEDURE — 1090F PRES/ABSN URINE INCON ASSESS: CPT | Performed by: ORTHOPAEDIC SURGERY

## 2025-07-16 NOTE — PROGRESS NOTES
[facility]      [facility]  [address] [place] [address] [id number]  [place] [address]  Dept: [phone]  Dept Fax: [phone]    Chief Compliant:  Chief Complaint   Patient presents with    Knee Pain     L knee         History of Present Illness:  This is a 70 y.o. female who presents to the clinic today for evaluation / follow up of left knee pain.  She has been having significant pain over the last 3 to 4 months.  As she really thinks about it more she feels like she has had dull aching pain on and off over this last year that is gotten progressively worse.  She did have a steroid injection she has done a home therapy program which she has not had any relief.  She still having a hard time walking and performing activities of daily living.  She has been utilizing ibuprofen and Tylenol.  She does physical fall risk at times given significant mild pain..       Physical Exam:    On exam today the left knee is tender palpation over the joint line.  There is no varus or valgus instability she is a couple degrees shy of full extension she can flex it back appropriately to 110 degrees she has good quadricep strength.  She is walking with an antalgic gait today.  There is no effusion on the knee today.    Nursing note and vitals reviewed.     Labs and Imaging: Left knee x-rays show that she does have loss of joint space medially with large osteophytes and subchondral sclerosis even some osteophytes on lateral compartment as well.    XR taken today:  No results found.      No orders of the defined types were placed in this encounter.      Assessment and Plan:  No diagnosis found.      This is a 70 y.o. female with left knee arthritis.  I discussed with her that the treatment choices would be really to continue to tolerate this or for total knee replacement.  At this point she would like to go forward with a total